# Patient Record
Sex: FEMALE | Race: WHITE
[De-identification: names, ages, dates, MRNs, and addresses within clinical notes are randomized per-mention and may not be internally consistent; named-entity substitution may affect disease eponyms.]

---

## 2019-01-22 ENCOUNTER — HOSPITAL ENCOUNTER (EMERGENCY)
Dept: HOSPITAL 25 - ED | Age: 59
Discharge: HOME | End: 2019-01-22
Payer: COMMERCIAL

## 2019-01-22 VITALS — SYSTOLIC BLOOD PRESSURE: 119 MMHG | DIASTOLIC BLOOD PRESSURE: 65 MMHG

## 2019-01-22 DIAGNOSIS — J45.909: ICD-10-CM

## 2019-01-22 DIAGNOSIS — J44.1: Primary | ICD-10-CM

## 2019-01-22 DIAGNOSIS — Z87.891: ICD-10-CM

## 2019-01-22 DIAGNOSIS — E78.00: ICD-10-CM

## 2019-01-22 PROCEDURE — 99282 EMERGENCY DEPT VISIT SF MDM: CPT

## 2019-01-22 PROCEDURE — 71046 X-RAY EXAM CHEST 2 VIEWS: CPT

## 2019-01-22 NOTE — XMS REPORT
Continuity of Care Document (CCD)

 Created on:2019



Patient:Arley Zamora

Sex:Female

:1960

External Reference #:[id number]





Demographics







 Address  [address] [place] [address]

 

 Home Phone  [phone]

 

 Mobile Phone  [phone]

 

 Work Phone  [phone]

 

 Preferred Language  en

 

 Marital Status  Not  or 

 

 Buddhist Affiliation  Unknown

 

 Race  White

 

 Ethnic Group  Not  or 









Author







 Name  Shailesh Tse NMERLE

 

 Address  [address] [place] [address]









Care Team Providers







 Name  Role  Phone

 

 Shailesh Tse N.P.  Care Team Information   Unavailable









Payers







 Type  Date  Identification Numbers  Payment Provider  Subscriber

 

     Policy Number: [id number]  [facility]  Arley Zamora









 PayID: [id number]  [address]









 [place] [address]









   Effective: 1998  Policy Number: [id number]  Special Funds  Arley Zamora









 Onset: 1998  Group Number: [id number]  [id number] [facility]









 PayID: [id number]  [place] [address]







Advance Directives







 Description

 

 No Information Available







Problems







 Date  Description  Provider  Status

 

 Onset: 2007  Peptic reflux disease  Shailesh Tse, N.P.  Active

 

 Onset: 2007  Allergic rhinitis due to pollen  Shailesh Tse, N.P.  
Active

 

 Onset: 2007  Pure hypercholesterolemia  Shailesh Tse, N.P.  Active







Family History







 Date  Family Member(s)  Problem(s)  Comments

 

 :  (age  Father   due to MI  



 75 Years)      

 

   Mother   due to Congestive  ()



     Heart Failure  

 

   Mother  Osteoporosis  

 

   Mother  Nonhogkins Lymphoma  

 

   First Sister  Osteoporosis  

 

   First Sister  Heart Disease   quadruple bypass



       2018

 

   Second Sister  Osteoporosis  

 

   Second Sister  Cancer, Uterine  

 

   Second Sister  Diabetes, Adult  

 

   Second Sister  Hypothyroidism  

 

   Third Sister  Osteoporosis  

 

   Maternal Grandfather  Cancer, Colon  

 

   Maternal Aunts  Cancer, Breast  







Social History







 Type  Date  Description  Comments

 

 Birth Sex    Unknown  

 

 Marital Status       (2015)

 

 Occupation     at prison  

 

 ETOH Use    Denies alcohol use  

 

 Tobacco Use  Start: Unknown End:  Patient is a former  



   Unknown  smoker  

 

 Smoking Status  Reviewed: 17  Patient is a former  



     smoker  

 

 Tattoo/Piercing    Tattoo  left ankle/rt calf/ rt



       arm/ rt toe

 

 Personal Habits    Previous Smoker 1/2 pack  



     daily for 11 yrs  







Allergies, Adverse Reactions, Alerts







 Date  Description  Reaction  Status  Severity  Comments

 

 2006  PCN    Active    







Medications







 Medication  Date  Status  Form  Strength  Qnty  SIG  Indications  Ordering



                 Provider

 

 Zithromax    Active  Tablets  250mg  6tabs  2 po day              one then 1    Mashelle,



             tab po qd    N.P.



             x 4 days    

 

 Work Note    Active        no work    19-1/    Mashelle,



              for    N.P.



             medical    



             reasons    

 

 Vitamin D3    Active  Chewtabs  2000Unit                      Mashelle,



                 N.P.

 

 Cyclobenzaprine    Active  Tablets  10mg  15tab  take one    ,



 HCL  /2018        s  tablet by    Mashelle,



             mouth    N.P.



             three    



             times a    



             day as    



             needed    



             muscle    



             spasm    

 

 Ofloxacin (Otic)    Active  Solution  0.3%  5ml  4 drops L              ear qd x 5    Mashelle,



             days    N.P.

 

 Hydrocortisone    Active  Suppository  25mg  24uni  insert one    



 Acetate          ts  rectally    Mashelle,



             every 6-8h    N.P.



             as needed    

 

 Flonase Allergy    Active  Suspension  50mcg/Act  9.900  2 sprays    
Dedrick,



 Relief  /2016        ml  each    Mashelle,



             nostril q    N.P.



             am    

 

 Atorvastatin    Active  Tablets  10mg  30tab  take 1    Dedrick,



 Calcium          s  tablet by    Mashelle,



             mouth once    N.P.



             daily    

 

 Famciclovir    Active  Tablets  500mg  30tab  take 1    [place],



           s  tablet by    Mashelle,



             mouth once    N.P.



             daily    

 

 Montelukast    Active  Tablets  10mg  30tab  take 1    Dedrick,



 Sodium          s  tablet by    Mashelle,



             mouth at    N.P.



             bedtime    

 

 Tramadol HCL    Active  Tablets  50mg  20tab  take 1 to            s  2 tablets    Mashelle,



             by mouth    N.P.



             every 6    



             hours as    



             needed for    



             pain,    



             maximum    



             daily    



             dose=eight    



             tablets    

 

                 

 

 Ciprofloxacin HCL    Hx  Tablets  500mg  20tab  1 by mouth    [place],



           s  twice a    Mashelle,



   -          day x 10    N.P.



   08/09          days    



   /2018              

 

 Alendronate    Hx  Tablets  70mg  4tabs  take one    [place],



 Sodium            tablet by    Mashelle,



   -          mouth once    N.P.



             weekly as    



             directed    

 

 Ciprofloxacin HCL    Hx  Tablets  500mg  20tab  1 by mouth    Dedrick,



           s  twice a    Mashelle,



   -          day x 10    N.P.



   03/27          days    



   /2018              

 

 Azithromycin    Hx  Tablets  250mg  6tabs  2 by mouth    [place],



             day one    Mashelle,



   -          then 1 by    N.P.



   03/20          mouth    



   /2018          every day    



             x 4 days    

 

 Work Note    Hx        no work    [place],



   /2017          6/15/17    Mashelle,



   -          for    N.P.



             medical    



             reasons    

 

 Cyclobenzaprine    Hx  Tablets  10mg  30tab  take one    [place],



 HCL          s  tablet by    Mashelle,



   -          mouth    N.P.



   03/27          three    



   /2018          times a    



             day as    



             needed    



             muscle    



             spasm    

 

 Ibuprofen    Hx  Tablets  800mg  40tab  1 by mouth    [place],



           s  four times    Mashelle,



   -          a day with    N.P.



   03/27          food    



   /2018              

 

 Doxycycline    Hx  Tablets  100mg  20tab  1 by mouth    Dedrick,



 Hyclate  /2016        s  twice a    Mashelle,



   -          day x 10    N.P.



   01/26          days    



   /2017              

 

 Methylprednisolon    Hx  Tablets  4mg  1pack  take as    Dedrick,



           directed    Mashelle,



   -              N.P.



                 

 

 Cheratussin ac    Hx  Syrup  100-10mg/  473ml  1 teaspoon          5ML    every 4-6    Mashelle,



   -          h as    N.P.



             cough    

 

 Prednisone    Hx  Tablets  50mg  3tabs  1 by mouth    Dedrick,



             every day    Mashelle,



   -          x 3 days    N.P.



                 

 

 Sulfamethoxazole/    Hx  Tablets  800-160mg  20tab  1 po bid x    [place],



 Trimethoprim         s  10 day    Mashelle,



   -              N.P.



                 

 

 Ciprofloxacin HCL    Hx  Tablets  500mg  20tab  1 by mouth    [place],



   /2016        s  twice a    Mashelle,



   -          day x 10    N.P.



   11/16          days    



   /2016              

 

 Meclizine HCL    Hx  Tablets  25mg  12tab  1 by mouth    ,



           s  three    Mashelle,



   -          times a    N.P.



   01/26          day    



   /2017              

 

 Escitalopram    Hx  Tablets  10mg  30tab  take 1    ,



 Oxalate  /2016        s  tablet by    Mashelle,



   -          mouth    N.P.



   11/16          every    



   /2016          night at    



             bedtime    

 

 Prolia    Hx  Solution  60mg/ml    60mg q 6              mo    Masefrenle,



   -              N.P.



                 

 

 Ibuprofen    Hx  Tablets  800mg  40tab  1 by mouth    [place],



           s  four times    Mashelle,



   -          a day with    N.P.



   07/21          food    



   /2016              

 

 Doxycycline    Hx  Capsules  100mg  20cap  1 by mouth    Dedrick,



 Hyclate  /2015        s  twice a    Mashelle,



   -          day x 10    N.P.



   02/04          day    



   /2016              

 

 Cefdinir    Hx  Capsules  300mg  20cap  1 po bid x    [place],



           s  10 days    Mashelle,



   -              N.P.



                 

 

 Desloratadine  10/20  Hx  Tablets  5mg  30tab  1 by mouth    Dedrick,



           s  every day    Mashelle,



   -              N.P.



                 

 

 Hydroxyzine HCL    Hx  Tablets  25mg  60tab  1-2 PO q    [place],



           s  hs    [facility],



   -              N.P.



   10/20              



   /2015              

 

 Wellbutrin XL    Hx  Tablets ER  150mg  30tab  1 by mouth    [place],



       24HR    s  every am    Mashelle,



   -              N.P.



   10/20              



   /2015              

 

 Cipro    Hx  Tablets  500mg  20tab  1 by mouth    [place],



           s  twice a    Mashelle,



   -          day x 10    N.P.



   07/07          days    



   /2015              

 

 Azithromycin    Hx  Tablets  250mg  6tabs  2 po day              one then 1    Mashelle,



   -          po qd x 4    N.P.



   09/30          days    



   /2014              

 

 Cheratussin ac    Hx  Syrup  100-10mg/  236ml  1 teaspoon          5ML    every 4-6    Mashelle,



   -          h prn    N.P.



             cough    



                 

 

 Cefdinir  10/10  Hx  Capsules  300mg  20cap  1 po bid x            s  10 days    Mashelle,



   -              N.P.



                 

 

 Cheratussin ac    Hx  Syrup  100-10mg/  236ml  1 teaspoon          5ML    every 4-6    Mashelle,



   -          h prn    N.P.



   10/10          cough    



                 

 

 Azithromycin    Hx  Tablets  250mg  6tabs  2 po day              one then 1    Mashelle,



   -          po qd x 4    N.P.



   04/01          days    



   /2013              

 

 Methylprednisolon    Hx  Tablets  4mg  1pack  take as    Dedrick,



 e Dose Pack  /2013          directed    [place],



   -              N.P.



   10/10              



   /2013              

 

 Doxycycline  01/15  Hx  Tablets  100mg  20tab  1 po bid x    Dedrick,



 Hyclate  /2013        s  10 days    Mashelle,



   -              N.P.



   10/10              



   /2013              

 

 Cipro    Hx  Tablets  500mg  20tab  1 po bid x            s  10 days    Mashelle,



   -              N.P.



   01/15              



   /2013              

 

 Cheratussin ac    Hx  Syrup  100-10mg/  236ml  1 teaspoon          5ML    every 4-6    Mashelle,



   -          h prn    N.P.



   01/15          cough    



   /2013              

 

 Work Note    Hx        no work    Dedrick,



   /2012          3/28/13-4/    Shailesh,



   -          3/13   for    N.P.



   07/07          medical    



   /2015          reasons    

 

 Diflucan  06/15  Hx  Tablets  150mg  1tabs  1 po qd x              1    Mashelle,



   -              N.P.



                 

 

 Medical Order    Hx        soft              cervical    Masashlie,



   -          collar    N.P.



             sig: use    



             as    



             directed    



             dx:cervica    



             lgia    

 

 Xyzal    Hx  Tablets  5mg  30tab  1 po qd            s      Shailesh,



   -              N.P.



                 

 

 Work Note  10/24  Hx        may return    Dedrick          to work    Shailesh,



   -          10/25/11    N.P.



                 

 

 Crestor    Hx  Tablets  10mg  15tab  Take         s  Tablet By    Shailesh,



   -          Mouth Once    N.P.



   01/09          Daily    



   /2014              

 

 Fexofenadine HCL    Hx  Tablets  60mg  60tab  Take One            s  Tablet By    Shailesh,



   -          Mouth    N.P.



             Twice A    



             Day    

 

 Ibuprofen    Hx  Tablets  600mg  40tab  1 po qid    Dedrick        s  with food    Shailesh,



   -              N.P.



                 

 

 Cyclobenzaprine    Hx  Tablets  10mg  90tab  take one    ,



 HCL  /2011        s  tablet by    Shailesh,



   -          mouth    N.P.



   07/21          three    



   /2016          times a    



             day    

 

 Zithromax Z-Darius    Hx  Tablets  250mg  1tabs  as              directed    Shailesh,



   -              N.P.



                 

 

 Work Note    Hx        no work    Dedrick11-    Shailesh



     for    N.P.



   06/09          medical    



   /2011          reasons    

 

 Diflucan    Hx  Tablets  100mg  21tab  2 tabs day            s  one and    Shailesh,



   -          then qd    N.P.



                 

 

 Estradiol    Hx  Patches  0.0375mg/    apply one    Dedrick,



   /2011    Weekly  24HR    patch q    Shailesh,



   -          week    N.P.



                 

 

 Estradiol    Hx  Patches  0.025mg/2  4unit  apply     Weekly  4HR  s  patch    Shailesh,



   -          every week    N.P.



   06/14          as    



   /2012          directed    

 

 Anusol-HC  10/06  Hx  Suppository  25mg  25uni  insert one    Dedrick,



   /2010        ts  rectally    Shailesh,



   -          bid-tid    N.P.



                 

 

 Flexeril  10/06  Hx  Tablets  10mg  90tab  1 po tid            s      Shailesh,



   -              N.P.



                 

 

 Soma    Hx  Tablets  350mg  40tab  1 po qid    Dedrick        s      Shailesh



   -              N.P.



   10/06              



   /2010              

 

 Estradiol    Hx  Patches  0.075mg/2        [place]    Weekly  4HR        Shailesh,



   -              N.P.



                 

 

 Citalopram    Hx  Tablets  20mg  30tab  take one    Dedrick,



 Hydrobromide          s  tablet by    Shailesh,



   -          mouth    N.P.



             every day    



                 

 

 Diflucan    Hx  Tablets  150mg  1tabs  1 po qd x    Dedrick          1    Shailesh,



   -              N.P.



                 

 

 Lexapro    Hx  Tablets  10mg  30tab  1 po qd    Dedrick,



           s      Shailesh,



   -              N.P.



                 

 

 Zithromax Z-Darius    Hx  Tablets  250mg  1tabs  as              directed    Shailesh,



   -              N.P.



                 

 

 Crestor  02/15  Hx  Tablets  5mg  30tab  1 po qd    [place]        s      Shailesh,



   -              N.P.



                 

 

 Cipro    Hx  Tablets  500mg  20tab  1 po bid x    [place],



           s  10 days    Shailesh,



   -              N.P.



                 

 

 Allegra    Hx  Tablets  60mg  60tab  1 po bid    Dedrick        s      Shailesh



   -              N.P.



                 

 

 Medrol Dosepak    Hx  Tablets  4mg  1Pack  as    Eppto          directeted    Chris Adam MD



                 

 

 Nasonex    Hx  Suspension  50mcg/Act  1unit  2  sp x 2    [place],



           s  qd    Chris Adam MD



                 

 

 Cipro    Hx  Tablets  500mg  28tab  1 po bid x    Dedrick,



           s  14 days    Shailesh,



   -              N.P.



                 

 

 Singulair    Hx  Tablets  10mg  30tab  take one            s  tablet by    Shailesh,



   -          mouth    N.P.



   09/20          every    



   /2012          evening    

 

 Cipro    Hx  Tablets  500mg  20tab  1 po bid x    [place],



           s  10 days    Shailesh,



   -              N.P.



                 

 

 Simvastatin    Hx  Tablets  40mg  30tab  1 po qd    Dedrick,



           s      Shailesh,



   -              N.P.



                 

 

 Famvir    Hx  Tablets  500mg  30tab  take one            s  tablet by    Shailesh,



   -          mouth    N.P.



             every day    



                 

 

 Pantoprazole    Hx  Tablets DR  40mg  30tab  take 1    ,



 Sodium  /2009        s  tablet by    Shailesh,



   -          mouth once    N.P.



             daily    



                 

 

 Physical Therapy    Hx        eval and              treat l    Shailesh,



   -          shoulder    N.P.



             and neck    



             pain dx:    



             pain    

 

 Cipro    Hx  Tablets  500mg  20tab  1 po bid x    [place]        s  10 days    Shailesh,



   -              N.P.



                 

 

 Lidex    Hx  Cream  0.05%  30gm  apply bid    Dedrick,



   /2007          x 10 days    Shailesh,



   -              N.P.



                 

 

 Vytorin    Hx  Tablets  10mg;20  30tab  1 PO qd    Trabroman      mg  s      Chris Sam MD



                 

 

 Nexium    Hx  Capsules  20mg  90cap  1 po qd    [place],



           s      Shailesh,



   -              N.P.



                 

 

 Allegra D    Hx  Tablets  60mg;120  60tab  1 po q 12h    [place],



         mg  s      Shailesh,



   -              N.P.



                 

 

 Crestor    Hx  Tablets  10mg  30tab  1 po qd    Dedrick,



           s      Shailesh,



   -              N.P.



                 

 

 Fosamax    Hx  Tablets  70mg  12tab  1 po q    [place],



           s  week on    Mashelle,



   -          empty    N.P.



             stomach as    



   /2010          dir    

 

 Valtrex    Hx  Caplets  500mg  14cap  1 po  q D    [place]        s      Masashlie,



   -              N.P.



                 

 

 Valtrex    Hx  Caplets  1Gram  90cap  1 po qd    [place],



           s      Shailesh,



   -              N.P.



                 

 

 Celebrex    Hx  Capsules  200mg  30cap  1 po qd    Dedrick,



           s      Shailesh,



   -              N.P.



                 

 

 Darvocet N 100    Hx  Tablets  100mg;650  40tab  1 po q 4-6    [place]      mg  s  hours prn    Shailesh,



   -              N.P.



                 

 

 Flexeril    Hx  Tablets  10mg  90tab  1 po tid    Dedrick,



           s      Shailesh,



   -              N.P.



                 







Immunizations







 CPT Code  Status  Date  Vaccine  Lot #

 

 [id number]  Given  10/18/2018  Influenza Vac, Quadrivalent, Split, 0.5mL Dosage,  



       Im Use  

 

 12440  Given  2016  Influenza Vac, Quadrivalent, Split, 0.5mL Dosage,  
[id number]



       Im Use  

 

 00579  Given  2016  Tdap (Adacel) Ages 7 And Above Only  [id number]

 

   Given  10/10/2013  Fluzone Trivalent Immunization  

 

   Given  10/10/2013  Fluzone Trivalent Immunization  

 

   Given  10/10/2013  Fluzone Trivalent Immunization  [id number]







Vital Signs







 Date  Vital  Result  Comment

 

 2019 11:50am  Body Temperature  99.5 F  









 Weight  138.12 lb  

 

 Heart Rate  81 /min  

 

 BP Systolic  136 mmHg  

 

 BP Diastolic  86 mmHg  

 

 O2 % BldC Oximetry  97 %  









 2018  7:51am  Body Temperature  97.3 F  









 Weight  139.00 lb  

 

 Heart Rate  62 /min  

 

 BP Systolic  114 mmHg  

 

 BP Diastolic  76 mmHg  

 

 O2 % BldC Oximetry  98 %  









 2018  7:45am  Body Temperature  96.8 F  









 Weight  139.12 lb  

 

 Heart Rate  59 /min  

 

 BP Systolic  112 mmHg  

 

 BP Diastolic  71 mmHg  

 

 O2 % BldC Oximetry  98 %  









 2018  8:21am  Body Temperature  97.2 F  









 Weight  139.50 lb  

 

 Heart Rate  67 /min  

 

 BP Systolic  124 mmHg  

 

 BP Diastolic  73 mmHg  

 

 O2 % BldC Oximetry  98 %  









 2018 10:28am  Body Temperature  98.1 F  









 Weight  140.50 lb  

 

 Heart Rate  65 /min  

 

 BP Systolic  117 mmHg  

 

 BP Diastolic  67 mmHg  

 

 Height  64.4 inches  5'4.40"

 

 O2 % BldC Oximetry  99 %  

 

 BMI (Body Mass Index)  23.8 kg/m2  









 2018  8:20am  Body Temperature  98.2 F  









 Weight  142.00 lb  

 

 Heart Rate  72 /min  

 

 BP Systolic  112 mmHg  

 

 BP Diastolic  68 mmHg  

 

 O2 % BldC Oximetry  98 %  









 2018  1:16pm  Body Temperature  98.4 F  









 Weight  142.00 lb  

 

 Heart Rate  79 /min  

 

 BP Systolic  112 mmHg  

 

 BP Diastolic  68 mmHg  

 

 O2 % BldC Oximetry  98 %  









 2017  1:11pm  Body Temperature  98.2 F  









 Weight  138.38 lb  

 

 Heart Rate  88 /min  

 

 BP Systolic  128 mmHg  

 

 BP Diastolic  75 mmHg  

 

 O2 % BldC Oximetry  98 %  









 2017  3:19pm  Body Temperature  97.3 F  









 Weight  140.50 lb  

 

 Heart Rate  71 /min  

 

 BP Systolic  106 mmHg  

 

 BP Diastolic  63 mmHg  

 

 O2 % BldC Oximetry  96 %  









 2017  9:12am  Body Temperature  97.3 F  









 Weight  140.38 lb  

 

 Heart Rate  59 /min  

 

 BP Systolic  121 mmHg  

 

 BP Diastolic  69 mmHg  

 

 Height  54 inches  4'6"

 

 BMI (Body Mass Index)  33.8 kg/m2  









 2016  2:11pm  Body Temperature  97.2 F  









 Weight  144.25 lb  

 

 Heart Rate  80 /min  

 

 BP Systolic  103 mmHg  

 

 BP Diastolic  64 mmHg  

 

 O2 % BldC Oximetry  98 %  









 2016  1:00pm  Body Temperature  97.5 F  









 Weight  146.25 lb  

 

 Heart Rate  64 /min  

 

 BP Systolic  122 mmHg  

 

 BP Diastolic  70 mmHg  

 

 O2 % BldC Oximetry  99 %  









 2016 10:55am  Body Temperature  97.3 F  









 Weight  150.50 lb  

 

 Heart Rate  80 /min  

 

 BP Systolic  129 mmHg  

 

 BP Diastolic  76 mmHg  

 

 O2 % BldC Oximetry  98 %  









 2016 12:46pm  Body Temperature  97.0 F  









 Weight  151.50 lb  

 

 Heart Rate  83 /min  

 

 BP Systolic  137 mmHg  

 

 BP Diastolic  74 mmHg  

 

 O2 % BldC Oximetry  98 %  









 2016  9:29am  Body Temperature  98.2 F  









 Weight  149.38 lb  

 

 Heart Rate  71 /min  

 

 BP Systolic  121 mmHg  

 

 BP Diastolic  75 mmHg  

 

 O2 % BldC Oximetry  99 %  









 2016  1:32pm  Body Temperature  96.6 F  









 Weight  152.12 lb  

 

 Heart Rate  86 /min  

 

 BP Systolic  113 mmHg  

 

 BP Diastolic  70 mmHg  

 

 O2 % BldC Oximetry  98 %  









 2016  1:13pm  Body Temperature  97.5 F  









 Weight  152.38 lb  

 

 Heart Rate  91 /min  

 

 BP Systolic  120 mmHg  

 

 BP Diastolic  69 mmHg  

 

 O2 % BldC Oximetry  98 %  









 2015  3:41pm  Body Temperature  97.9 F  









 Weight  152.25 lb  

 

 Heart Rate  71 /min  

 

 BP Systolic  119 mmHg  

 

 BP Diastolic  70 mmHg  









 10/20/2015  1:57pm  Body Temperature  97.9 F  









 Weight  152.00 lb  

 

 Heart Rate  79 /min  

 

 BP Systolic  114 mmHg  

 

 BP Diastolic  69 mmHg  

 

 O2 % BldC Oximetry  99 %  









 2015  1:03pm  Body Temperature  97.5 F  









 Weight  153.38 lb  

 

 Heart Rate  72 /min  

 

 BP Systolic  128 mmHg  

 

 BP Diastolic  78 mmHg  

 

 O2 % BldC Oximetry  97 %  









 2015  1:41pm  Body Temperature  97.0 F  









 Weight  152.38 lb  

 

 Heart Rate  80 /min  

 

 BP Systolic  118 mmHg  

 

 BP Diastolic  69 mmHg  

 

 O2 % BldC Oximetry  98 %  









 2014  3:26pm  Weight  156.12 lb  









 Heart Rate  72 /min  

 

 BP Systolic  120 mmHg  

 

 BP Diastolic  68 mmHg  

 

 O2 % BldC Oximetry  99 %  









 2014 10:54am  Body Temperature  98.2 F  









 Weight  150.00 lb  

 

 Heart Rate  72 /min  

 

 BP Systolic  111 mmHg  

 

 BP Diastolic  68 mmHg  

 

 O2 % BldC Oximetry  98 %  









 10/10/2013  8:15am  Body Temperature  96.5 F  









 Weight  147.00 lb  

 

 Heart Rate  80 /min  

 

 BP Systolic  110 mmHg  

 

 BP Diastolic  68 mmHg  

 

 O2 % BldC Oximetry  98 %  









 2013 10:44am  Body Temperature  96.2 F  









 Weight  139.12 lb  

 

 Heart Rate  78 /min  

 

 BP Systolic  110 mmHg  

 

 BP Diastolic  80 mmHg  

 

 O2 % BldC Oximetry  98 %  









 01/15/2013  4:26pm  Body Temperature  99.1 F  









 Weight  145.00 lb  

 

 Heart Rate  97 /min  

 

 BP Systolic  120 mmHg  

 

 BP Diastolic  80 mmHg  

 

 O2 % BldC Oximetry  98 %  









 2012 11:39am  Body Temperature  97.9 F  









 Weight  146.00 lb  

 

 Heart Rate  88 /min  

 

 BP Systolic  128 mmHg  

 

 BP Diastolic  74 mmHg  

 

 O2 % BldC Oximetry  98 %  









 2012  2:10pm  Body Temperature  97.8 F  









 Weight  147.00 lb  

 

 Heart Rate  87 /min  

 

 BP Systolic  110 mmHg  

 

 BP Diastolic  80 mmHg  

 

 O2 % BldC Oximetry  98 %  









 2012 12:52pm  Body Temperature  98.2 F  









 Weight  146.00 lb  

 

 Heart Rate  70 /min  

 

 BP Systolic  101 mmHg  

 

 BP Diastolic  61 mmHg  

 

 Height  63 inches  5'3"

 

 O2 % BldC Oximetry  96 %  

 

 BMI (Body Mass Index)  25.9 kg/m2  









 2012 11:13am  Body Temperature  98.1 F  









 Weight  149.12 lb  

 

 Heart Rate  75 /min  

 

 BP Systolic  115 mmHg  

 

 BP Diastolic  70 mmHg  

 

 O2 % BldC Oximetry  99 %  









 10/24/2011  1:21pm  Body Temperature  97.9 F  









 Weight  147.00 lb  

 

 Heart Rate  75 /min  

 

 BP Systolic  110 mmHg  

 

 BP Diastolic  70 mmHg  

 

 O2 % BldC Oximetry  93 %  









 2011  2:55pm  Weight  151.00 lb  









 Heart Rate  83 /min  

 

 BP Systolic  120 mmHg  

 

 BP Diastolic  70 mmHg  

 

 Height  64 inches  5'4"

 

 O2 % BldC Oximetry  98 %  

 

 BMI (Body Mass Index)  25.9 kg/m2  









 2011  1:50pm  Body Temperature  98.9 F  









 Weight  148.00 lb  

 

 Heart Rate  83 /min  

 

 BP Systolic  100 mmHg  

 

 BP Diastolic  70 mmHg  

 

 O2 % BldC Oximetry  98 %  









 03/10/2011  3:41pm  Weight  147.00 lb  









 Heart Rate  80 /min  

 

 BP Systolic  102 mmHg  

 

 BP Diastolic  60 mmHg  

 

 Height  63 inches  5'3"

 

 O2 % BldC Oximetry  99 %  

 

 BMI (Body Mass Index)  26.0 kg/m2  









 2011  1:19pm  Weight  147.00 lb  









 Heart Rate  78 /min  

 

 BP Systolic  100 mmHg  

 

 BP Diastolic  62 mmHg  

 

 O2 % BldC Oximetry  98 %  









 02/15/2011  8:18am  Body Temperature  98.3 F  









 Weight  147.00 lb  

 

 Heart Rate  62 /min  

 

 BP Systolic  100 mmHg  

 

 BP Diastolic  60 mmHg  

 

 O2 % BldC Oximetry  98 %  









 10/20/2010  3:19pm  Body Temperature  98.4 F  









 Weight  146.00 lb  

 

 Heart Rate  88 /min  

 

 BP Systolic  102 mmHg  

 

 BP Diastolic  68 mmHg  

 

 O2 % BldC Oximetry  98 %  









 10/06/2010  3:27pm  Body Temperature  98.4 F  









 Weight  147.00 lb  

 

 Heart Rate  83 /min  

 

 BP Systolic  108 mmHg  

 

 BP Diastolic  70 mmHg  

 

 Respiratory Rate  20 /min  

 

 O2 % BldC Oximetry  96 %  









 2010  1:05pm  Body Temperature  98.5 F  









 Weight  142.00 lb  

 

 Heart Rate  64 /min  

 

 BP Systolic  100 mmHg  

 

 BP Diastolic  60 mmHg  









 2010  3:54pm  Body Temperature  98.4 F  









 Weight  141.00 lb  

 

 Heart Rate  69 /min  

 

 BP Systolic  110 mmHg  

 

 BP Diastolic  64 mmHg  

 

 Height  64 inches  5'4"

 

 O2 % BldC Oximetry  98 %  

 

 BMI (Body Mass Index)  24.2 kg/m2  









 2010  3:25pm  Body Temperature  99.2 F  









 Weight  141.00 lb  

 

 Heart Rate  82 /min  

 

 BP Systolic  120 mmHg  

 

 BP Diastolic  62 mmHg  

 

 Height  64 inches  5'4"

 

 O2 % BldC Oximetry  98 %  

 

 BMI (Body Mass Index)  24.2 kg/m2  









 2010  8:33am  Body Temperature  98.1 F  









 Weight  143.00 lb  

 

 Heart Rate  72 /min  

 

 BP Systolic  108 mmHg  

 

 BP Diastolic  60 mmHg  

 

 Height  64 inches  5'4"

 

 O2 % BldC Oximetry  99 %  

 

 BMI (Body Mass Index)  24.5 kg/m2  









 2010  3:47pm  Body Temperature  98.5 F  









 Weight  147.00 lb  

 

 Heart Rate  70 /min  

 

 BP Systolic  132 mmHg  

 

 BP Diastolic  72 mmHg  









 2009  8:21am  Body Temperature  98.5 F  









 Weight  162.00 lb  

 

 Heart Rate  70 /min  

 

 BP Systolic  124 mmHg  

 

 BP Diastolic  70 mmHg  









 2009  3:35pm  Body Temperature  98.7 F  









 Weight  160.00 lb  

 

 Heart Rate  68 /min  

 

 BP Systolic  132 mmHg  

 

 BP Diastolic  68 mmHg  

 

 O2 % BldC Oximetry  98 %  









 2009  9:51am  Body Temperature  97.8 F  









 Weight  161.00 lb  

 

 Heart Rate  90 /min  

 

 BP Systolic  124 mmHg  

 

 BP Diastolic  84 mmHg  

 

 Height  64 inches  5'4"

 

 BMI (Body Mass Index)  27.6 kg/m2  









 2009 12:48pm  Body Temperature  98.0 F  









 Weight  163.00 lb  

 

 Heart Rate  80 /min  

 

 BP Systolic  118 mmHg  

 

 BP Diastolic  78 mmHg  

 

 Height  64 inches  5'4"

 

 O2 % BldC Oximetry  96 %  

 

 BMI (Body Mass Index)  28.0 kg/m2  









 2008  2:22pm  Weight  162.00 lb  









 Heart Rate  80 /min  

 

 BP Systolic  107 mmHg  

 

 BP Diastolic  73 mmHg  

 

 Height  64 inches  5'4"

 

 BMI (Body Mass Index)  27.8 kg/m2  









 2008  8:38am  Body Temperature  98.5 F  









 Weight  162.00 lb  

 

 Heart Rate  80 /min  

 

 BP Systolic  130 mmHg  

 

 BP Diastolic  82 mmHg  

 

 Height  64 inches  5'4"

 

 BMI (Body Mass Index)  27.8 kg/m2  









 2008  3:04pm  Weight  161.00 lb  









 Heart Rate  102 /min  

 

 BP Systolic  150 mmHg  

 

 BP Diastolic  76 mmHg  

 

 Height  64 inches  5'4"

 

 BMI (Body Mass Index)  27.6 kg/m2  









 10/04/2007  1:41pm  Body Temperature  98.1 F  









 Weight  158.00 lb  

 

 Heart Rate  76 /min  

 

 BP Systolic  104 mmHg  

 

 BP Diastolic  72 mmHg  

 

 Height  64 inches  5'4"

 

 BMI (Body Mass Index)  27.1 kg/m2  









 2007  3:33pm  Body Temperature  98.0 F  









 Weight  155.00 lb  

 

 Heart Rate  64 /min  

 

 BP Systolic  104 mmHg  

 

 BP Diastolic  66 mmHg  

 

 Height  64 inches  5'4"

 

 BMI (Body Mass Index)  26.6 kg/m2  









 2007  4:12pm  Body Temperature  98.8 F  









 Weight  157.00 lb  

 

 Heart Rate  80 /min  

 

 BP Systolic  110 mmHg  

 

 BP Diastolic  66 mmHg  

 

 Height  64 inches  5'4"

 

 BMI (Body Mass Index)  26.9 kg/m2  









 2007  3:39pm  Body Temperature  98.1 F  









 Weight  157.00 lb  

 

 Heart Rate  76 /min  

 

 BP Systolic  110 mmHg  

 

 BP Diastolic  74 mmHg  

 

 Height  64 inches  5'4"

 

 BMI (Body Mass Index)  26.9 kg/m2  









 10/19/2006  3:25pm  Body Temperature  97.7 F  









 Weight  157.00 lb  

 

 Heart Rate  76 /min  

 

 BP Systolic  100 mmHg  

 

 BP Diastolic  74 mmHg  

 

 Height  64 inches  5'4"

 

 BMI (Body Mass Index)  26.9 kg/m2  









 2006  4:36pm  Body Temperature  99.0 F  









 Weight  156.00 lb  

 

 Heart Rate  64 /min  

 

 BP Systolic  126 mmHg  

 

 BP Diastolic  76 mmHg  

 

 Height  64 inches  5'4"

 

 BMI (Body Mass Index)  26.8 kg/m2  









 2006 10:23am  Body Temperature  98.3 F  









 Weight  156.00 lb  

 

 Heart Rate  84 /min  

 

 BP Systolic  110 mmHg  

 

 BP Diastolic  76 mmHg  

 

 Height  64 inches  5'4"

 

 BMI (Body Mass Index)  26.8 kg/m2  







Results







 Test  Date  Facility  Test  Result  H/L  Range  Note

 

 Comprehensive Met Panel-[facility]  2018  [facility]  Sodium  142 mmol/L    135-
146  1









 Potassium  3.8 mmol/L    3.5-5.2  

 

 Chloride#  104 mmol/L      2

 

 Carbon Dioxide  28 mmol/L    24-34  

 

 Glucose  83 mg/dL      

 

 BUN  16 mg/dL    6-26  

 

 Creatinine  0.8 mg/dL    0.5-1.4  

 

 Calcium  9.0 mg/dL    8.5-10.2  

 

 Total Protein  6.7 g/dL    6.0-8.0  

 

 Albumin  4.8 g/dL    3.6-4.9  

 

 Globulin  1.9 g/dL  Low  2.0-3.5  

 

 A/G Ratio  2.5 Ratio  High  1.0-2.2  

 

 Total Bilirubin  0.7 mg/dL    0.1-1.3  

 

 Alkaline Phosphatase  51 U/L      

 

 Alt  12 U/L    3-42  

 

 Ast  14 U/L    8-42  

 

  Viola Egfr  >60    >60  3

 

 Non  Viola Egfr  >60    >60  4

 

 Anion Gap  10 mmol/L    5-15  5









 Lipid  2018  [facility]  Cholesterol  182 mg/dL      









 Triglycerides  78 mg/dL      

 

 HDL  60 mg/dL    35-85  6

 

 Chol/ HDL Ratio  3.0 ratio  Low  3.7-5.6  

 

 VLDL  16 mg/dL    2-29  

 

 LDL (Calc)  106 mg/dL  High  20-99  7









 Affirm  2018  [facility]  Trichomonas Vaginalis  Negative    Negative  









 Gardnerella Vaginalis  Negative    Negative  

 

 Candida Species  Negative    Negative  









 Laboratory test  2018  [facility]  [facility] Pap  SEE NOTE      8



 finding              

 

 Affirm  2017  [facility]  Trichomonas Vaginalis  Negative    Negative  9









 Gardnerella Vaginalis  Negative    Negative  

 

 Candida Species  Negative    Negative  









 Lipid  2017  [facility]  Cholesterol  180 mg/dL      









 Triglycerides  118 mg/dL      

 

 HDL  57 mg/dL    35-85  10

 

 Chol/ HDL Ratio  3.2 ratio  Low  3.7-5.6  

 

 VLDL  24 mg/dL    2-29  

 

 LDL (Calc)  99 mg/dL    20-99  11









 Comprehensive Metabolic (CMP)  2017  [facility]  Sodium  141 mmol/L    134-
142  









 Potassium  4.2 mmol/L    3.5-5.2  

 

 Chloride  102 mmol/L      

 

 Carbon Dioxide  31 mmol/L    24-34  

 

 Glucose  91 mg/dL      

 

 BUN  17 mg/dL    6-26  

 

 Creatinine  0.9 mg/dL    0.5-1.4  

 

 Calcium  9.7 mg/dL    8.5-10.2  

 

 Total Protein  7.3 g/dL    6.0-8.0  

 

 Albumin  4.8 g/dL    3.6-4.9  

 

 Globulin  2.5 g/dL    2.0-3.5  

 

 A/G Ratio  1.9 Ratio    1.0-2.2  

 

 Total Bilirubin  0.7 mg/dL    0.1-1.3  

 

 Alkaline Phosphatase  56 U/L      

 

 Alt  10 U/L    3-42  

 

 Ast  12 U/L    8-42  

 

 Anion Gap  12 mmol/L    6-14  

 

 African Viola Egfr  >60    >60  12

 

 Non  Viola Egfr  >60    >60  13









 Laboratory test finding  2017  [facility]  Calcium  9.7 mg/dL    8.5-10.2  









 TSH  2.11 uIU/mL    0.35-4.94  









 CBC With Auto Diff  2017  [facility]  WBC  5.3 K/uL    4.1-11.0  









 RBC  4.80 M/uL    4.00-5.40  

 

 Hemoglobin  14.1 gm/dL    12.0-16.0  

 

 Hematocrit  42.1 %    36.0-47.0  

 

 MCV  87.7 fL    80.0-97.0  

 

 MCH  29.3 pg    27.0-32.0  

 

 MCHC  33.4 g/dL    32.0-36.0  

 

 RDW  13.2 %    11.5-14.5  

 

 PLT Count  286 K/ul    140-400  

 

 Neutrophil  52.5 %    35.0-75.0  

 

 Lymphocyte  35.4 %    16.0-52.0  

 

 Monocyte  9.1 %    2.0-10.0  

 

 Eosinophil  1.2 %    0.0-5.0  

 

 Basophil  1.8 %    0.0-4.0  

 

 Abs Neutrophils  2.8 K/uL    2.1-8.0  

 

 Abs Lymphocytes  1.9 K/uL    0.8-5.5  

 

 Abs Monocytes  0.5 K/uL    0.1-1.0  

 

 Abs Eosinophils  0.1 K/uL    0.0-0.5  

 

 Abs Basophils  0.1 K/uL    0.0-0.3  









 Laboratory test  2017  [facility]  [facility] Pap  SEE NOTE      14



 finding              

 

 Laboratory test  2015  [facility]  Rheumatoid Factor  <10.0 IU/mL    0.0-
10.0  15



 finding              









 CRP (C-Reactive)  0.18 mg/dL    0.00-0.75  









 Laboratory test  2015  [facility]  Lyme Igm/Igg  NEGATIVE    (Neg)  16



 finding      AB        

 

 Laboratory test  2015  [facility]  CCP Antibody  3      17



 finding      Igg        

 

 Laboratory test  2015  [facility]  Afp-Tumor  2.3 ng/mL    (<6.0)  18, 19



 finding      Marker        

 

 Laboratory test  2015  [facility]  Hepatitis C  Non reactive    Non 
reactive  



 finding      [facility] Antibody        

 

 Comprehensive  2015  [facility]  Sodium  140 mmol/L    134-142  



 Metabolic (CMP)              









 Potassium  3.8 mmol/L    3.5-5.2  

 

 Chloride  104 mmol/L      

 

 Carbon Dioxide  28 mmol/L    24-34  

 

 Glucose  89 mg/dL      

 

 BUN  15 mg/dL    6-26  

 

 Creatinine  1.0 mg/dL    0.5-1.4  

 

 Calcium  9.3 mg/dL    8.5-10.2  

 

 Total Protein  6.9 g/dL    6.0-8.0  

 

 Albumin  4.5 g/dL    3.6-4.9  

 

 Globulin  2.4 g/dL    2.0-3.5  

 

 A/G Ratio  1.9 Ratio    1.0-2.2  

 

 Total Bilirubin  0.4 mg/dL    0.1-1.3  

 

 Alkaline Phosphatase  71 U/L      

 

 Alt  12 U/L    3-42  

 

 Ast  13 U/L    8-42  

 

 Anion Gap  12 mmol/L    6-14  

 

 African Viola Egfr  >60    >60  20

 

 Non  Viola Egfr  57  Low  >60  21









 Lipid  2015  [facility]  Cholesterol  178 mg/dL      









 Triglycerides  148 mg/dL      

 

 HDL  49 mg/dL    35-85  22

 

 Chol/ HDL Ratio  3.6 ratio  Low  3.7-5.6  

 

 VLDL  30 mg/dL  High  2-29  

 

 LDL (Calc)  99 mg/dL    20-99  23









 CBC With Auto Diff  2015  [facility]  WBC  5.4 K/uL    4.1-11.0  









 RBC  4.49 M/uL    4.00-5.40  

 

 Hemoglobin  12.8 gm/dL    12.0-16.0  

 

 Hematocrit  38.8 %    36.0-47.0  

 

 MCV  86.3 fL    80.0-97.0  

 

 MCH  28.5 pg    27.0-32.0  

 

 MCHC  33.1 g/dL    32.0-36.0  

 

 RDW  13.6 %    11.5-14.5  

 

 PLT Count  306 K/ul    140-400  

 

 Neutrophil  61.1 %    35.0-75.0  

 

 Lymphocyte  27.5 %    16.0-52.0  

 

 Monocyte  7.8 %    2.0-10.0  

 

 Eosinophil  2.6 %    0.0-5.0  

 

 Basophil  1.0 %    0.0-4.0  

 

 Abs Neutrophils  3.3 K/uL    2.1-8.0  

 

 Abs Lymphocytes  1.5 K/uL    0.8-5.5  

 

 Abmon  0.4 K/uL    0.1-1.0  

 

 Abs Eosinophils  0.1 K/uL    0.0-0.5  

 

 Abs Basophils  0.1 K/uL    0.0-0.3  









 Laboratory test  2012  [facility]  [facility] Pap  SEE NOTE      24



 finding              

 

 Affirm  2012  [facility]  Trichomonas Vaginalis  Negative    Negative  









 Gardnerella Vaginalis  Negative    Negative  

 

 Candida Species  Positive  Abnormal  Negative  









 Laboratory test  2012  [facility]  GC by Dna Probe  NEGATIVE    Negative  



 finding              

 

 Order  10/24/2011  [facility]  EKG  <pending>      



     (315)-   -          

 

 Laboratory test  2011  [facility]  GC by Dna Probe  NEGATIVE    Negative  



 finding              

 

 Affirm  2011  [facility]  Trichomonas  Negative    Negative  



       Vaginalis        









 Gardnerella Vaginalis  Negative    Negative  

 

 Candida Species  Negative    Negative  









 Laboratory test finding  2011  [facility]  [facility] Pap  SEE NOTE      25









 HPV  Positive  Abnormal  Negative  26









 Laboratory test  2011  [facility]      27



 finding    [phone]    <SEE NOTE>      

 

 CBC With Auto Diff  2010  [facility] (Do not Use)  WBC  5.0 K/ul    4.0-
10.  28



     [facility]        [address] [place] [phone]          









 RBC  4.43 M/ul    4.20-5.40  

 

 Hemoglobin  13.5 GM/dl    12.5-16.0  

 

 Hematocrit  39.9 %    36.0-47.0  

 

 MCV  90.1 FL    80.0-97.0  

 

 MCH  30.5 pg    27.0-31.0  

 

 MCHC  33.8 g/dL    32.0-36.0  

 

 RDW  12.9 %    11.5-14.5  

 

 Platelet Count  290 K/ul    140-440  

 

 Neutrophils  53.8 %    50-70  

 

 Lymphocytes  34.4 %    20-44  

 

 Monocytes  7.8 %    2-9  

 

 Eosinophil  2.5 %    0-4  

 

 Basophil  1.5 %    0-2  

 

 Absolute Neutrophils  2.7 K/ul    2.05-7.63  

 

 Absolute Lymphocytes  1.7 K/ul    0.8-4.8  

 

 Absolute Monocytes  0.4 K/ul    0.1-1.0  

 

 Absolute Eosinophils  0.1 K/ul    0.1-0.5  

 

 Absolute Basophils  0.1 K/ul    0.0-0.3  

 

 Hematology Comment (Comm2)  N/A      









 CMP  2010  [facility] (Do not Use)  Sodium  142 mmol/L    135-144  



     [facility]          



     [place] [address] [phone]          









 Potassium  4.3 mmol/L    3.6-5.2  

 

 Chloride  105 mmol/L      

 

 Carbon Dioxide  31 mmol/L    23-32  

 

 Glucose  88 mg/dL      

 

 BUN  13 mg/dL    6-22  

 

 Creatinine  0.7 mg/dL    0.5-1.3  

 

 BUN/CR  19 Ratio      

 

 Calcium  9.8 mg/dL    8.6-10.2  

 

 Total Protein  6.7 g/dL    5.8-7.8  

 

 Albumin  4.1 g/dL    3.5-4.8  

 

 Globulin  2.6 g/dL    2.0-3.5  

 

 A/G Ratio  1.6 Ratio    1.0-2.2  

 

 Total Bilirubin  0.7 mg/dL    0.3-1.2  

 

 Alkaline Phosphatase  72 U/L      

 

 Alt  21 U/L    5-45  

 

 Ast  23 U/L    12-40  

 

 Anion Gap  10 mmol/L    8-16  

 

 GFR Calculation  > 60 mL/min      29

 

 GFR For African American  > 60 mL/min      30









 Laboratory test  2010  [facility] (Do not Use)  Vitamin D, 25  64 ng/mL
      



 finding    [facility] [place]  [place] [address] [phone]          

 

 Lipid Panel  2010  [facility] (Do not Use)  Cholesterol  188 mg/dL    50
-199  



     [facility]          



     [place] [address] [phone]          









 Triglycerides  90 mg/dL      

 

 HDL  51 mg/dL    35-85  31

 

 Chol/HDL Ratio  3.7 Ratio    3.7-5.6  32

 

 VLDL  18 mg/dL    2-29  

 

 LDL (Calc)  119 mg/dL      33









 Lipid TX Panel  03/15/2010  [facility] (Do not Use)  Ast  22 U/L    12-40  



     [facility] [place] [address] [phone]          









 Alt  20 U/L    5-45  

 

 Cholesterol  176 mg/dL      34

 

 Triglycerides  97 mg/dL      

 

 HDL  51 mg/dL    35-85  35

 

 LDL (Calc)  106 mg/dL      36

 

 Chol/HDL Ratio  3.5 Ratio  Low  3.7-5.6  37

 

 VLDL  19 mg/dL    2-29  









 CBC With Auto Diff  2010  [facility] (Do not Use)  WBC  5.3 K/ul    4.0-
10.9  



     [facility] [place] [address] [phone]          









 RBC  4.72 M/ul    4.20-5.40  

 

 Hemoglobin  14.4 GM/dl    12.5-16.0  

 

 Hematocrit  42.2 %    36.0-47.0  

 

 MCV  89.4 FL    80.0-97.0  

 

 MCH  30.5 pg    27.0-31.0  

 

 MCHC  34.2 g/dL    32.0-36.0  

 

 RDW  12.9 %    11.5-14.5  

 

 Platelet Count  338 K/ul    140-440  

 

 Neutrophils  55.0 %    50-70  

 

 Lymphocytes  35.8 %    20-44  

 

 Monocytes  7.0 %    2-9  

 

 Eosinophil  1.5 %    0-4  

 

 Basophil  0.7 %    0-2  

 

 Absolute Neutrophils  2.9 K/ul    2.05-7.63  

 

 Absolute Lymphocytes  1.9 K/ul    0.8-4.8  

 

 Absolute Monocytes  0.4 K/ul    0.1-1.0  

 

 Absolute Eosinophils  0.1 K/ul    0.1-0.5  

 

 Absolute Basophils  0.0 K/ul    0.0-0.3  

 

 Hematology Comment (Comm2)  N/A      









 Lipid Panel  2010  [facility] (Do not Use)  Cholesterol  243 mg/dL  
High    38



     [facility]          



     [place] [address] [phone]          









 Triglycerides  117 mg/dL      

 

 HDL  51 mg/dL    35-85  39

 

 Chol/HDL Ratio  4.8 Ratio    3.7-5.6  40

 

 VLDL  23 mg/dL    2-29  

 

 LDL (Calc)  169 mg/dL  High    41









 CMP  2010  [facility] (Do not Use)  Sodium  143 mmol/L    135-144  



     [facility]          



     [place] [address] [phone]          









 Potassium  4.8 mmol/L    3.6-5.2  

 

 Chloride  106 mmol/L      

 

 Carbon Dioxide  30 mmol/L    23-32  

 

 Glucose  83 mg/dL      

 

 BUN  13 mg/dL    6-22  

 

 Creatinine  0.9 mg/dL    0.5-1.3  

 

 BUN/CR  14 Ratio      

 

 Calcium  10.1 mg/dL    8.6-10.2  42

 

 Total Protein  6.4 g/dL    5.8-7.8  

 

 Albumin  4.1 g/dL    3.5-4.8  

 

 Globulin  2.3 g/dL    2.0-3.5  

 

 A/G Ratio  1.8 Ratio    1.0-2.2  

 

 Total Bilirubin  0.5 mg/dL    0.3-1.2  

 

 Alkaline Phosphatase  74 U/L      

 

 Alt  23 U/L    5-45  

 

 Ast  25 U/L    12-40  

 

 Anion Gap  12 mmol/L    8-16  

 

 GFR Calculation  > 60 mL/min      43

 

 GFR For African American  > 60 mL/min      44









 Laboratory test  2010  [facility] (Do not Use)  Vitamin D, 25  71 ng/mL
      



 finding    [facility]  [place] [address] [phone]          

 

 Rast 1 Panel  2009  [facility] (Do not Use)  D Pteronyssinus  0.000    
  45



     [facility] [place] [address] [phone]          









 D Pteronyssinus Class  0 CLASS      

 

 Cat Epithelium Conc  0.000      

 

 Cat Epithelium Class  0 CLASS      

 

 Alternaria Conc  0.000      

 

 Alternaria Class  0 CLASS      

 

 Cladosporium Conc  0.000      

 

 Cladosporium Class  0 CLASS      

 

 Irvin Conc  0.000      

 

 Irvin Class  0 CLASS      

 

 Fremont/Maple Conc  0.000      

 

 Fremont/Maple Class  0 CLASS      

 

 Birch Conc  0.000      

 

 Birch Class  0 CLASS      

 

 Ragweed Conc  0.000      

 

 Ragweed Class  0 CLASS      

 

 Aspergillus Conc  0.000      

 

 Aspergillus Class  0 CLASS      

 

 Hitchcock Conc  0.000      

 

 Pine Class  0 CLASS      

 

 Bermuda Grass Conc  0.000      

 

 Bermuda Grass Class  0 CLASS      

 

 House Dust Conc  0.020      

 

 House Dust Class  0 CLASS      

 

 Lamb's Quarter Conc  0.000      

 

 Lamb's Quarter Class  0 CLASS      

 

 D Farinae Conc  0.000      

 

 D Farinae Class  0 CLASS      

 

 Dog Dander Conc  0.010      

 

 Dog Dander Class  0 CLASS      

 

 Ige, Total  TOTAL IgE IS LES <SEE NOTE> IU/mL      46









 Lipid TX Panel  2009  [facility] (Do not Use)  Ast  20 U/L    12-40  



     [place] [address] [phone]          









 Alt  17 U/L    4-45  

 

 Cholesterol  141 mg/dL      

 

 Triglycerides  110 mg/dL      

 

 HDL  32 mg/dL  Low  35-85  47

 

 LDL (Calc)  87 mg/dL      48

 

 Chol/HDL Ratio  4.4 Ratio    3.7-5.6  49

 

 VLDL  22 mg/dL    2-29  









 Hepatitis C AB  2009  [facility]  Hepatitis C AB  NEGATIVE    (Neg)  50



     [phone]  @        

 

 Laboratory test  2009  [facility] (Do not Use)  Hepatitis C AB  (SEE 
NOTE)    (Neg)  51, 52



 [place] [address] [phone]          

 

 Lipid TX Panel  2009  [facility] (Do not Use)  Ast  25 U/L    12-40  



     [facility]          



     [place] [address] [phone]          









 Alt  26 U/L    4-45  

 

 Cholesterol  175 mg/dL      

 

 Triglycerides  146 mg/dL      

 

 HDL  54 mg/dL    35-85  53

 

 LDL (Calc)  92 mg/dL      54

 

 Chol/HDL Ratio  3.2 Ratio  Low  3.7-5.6  55

 

 VLDL  29 mg/dL    2-29  









 Laboratory test  2008  [facility] (Do not Use)  HCG Quant  <1 MIU/ML   
   56



 finding    [facility]  [facility] [place] [facility] [place]        



     [place] [address] [phone]          

 

 CBC With Auto Diff  2008  [facility] (Do not Use)  WBC  7.5 K/ul    4.0-
10.  



     [facility]        [address] [place] [phone]          









 RBC  4.53 M/ul    4.20-5.40  

 

 Hemoglobin  14.1 GM/dl    12.5-16.0  

 

 Hematocrit  41.2 %    36.0-47.0  

 

 MCV  90.9 FL    80.0-97.0  

 

 MCH  31.1 pg  High  27.0-31.0  

 

 MCHC  34.3 g/dL    32.0-36.0  

 

 RDW  12.7 %    11.5-14.5  

 

 Platelet Count  331 K/ul    140-440  

 

 Neutrophils  62.6 %    50-70  

 

 Lymphocytes  26.7 %    20-44  

 

 Monocytes  8.0 %    2-9  

 

 Eosinophil  2.0 %    0-4  

 

 Basophil  0.7 %    0-2  

 

 Absolute Neutrophils  4.7 K/ul    2.05-7.63  

 

 Absolute Lymphocytes  2.0 K/ul    0.8-4.8  

 

 Absolute Monocytes  0.6 K/ul    0.1-1.0  

 

 Absolute Eosinophils  0.2 K/ul    0.1-0.5  

 

 Absolute Basophils  0.0 K/ul  Low  0.1-0.3  









 Lipid TX Panel  2007  [employer] (Do not Use)  Ast  24 U/L    12-40  57



     [facility]          



     [place] [address] [phone]          









 Alt  21 U/L    4-45  

 

 Cholesterol  171 mg/dL      

 

 Triglycerides  94 mg/dL      

 

 HDL  50 mg/dL    35-85  

 

 LDL (Calc)  102 mg/dL      

 

 Chol/HDL Ratio  3.4 Ratio      

 

 VLDL  19 mg/dL      









 CMP  2007  [employer] (Do not Use)  Sodium  142 mmol/L    135-144  



     [facility]          



     [place] [address] [phone]          









 Potassium  5.0 mmol/L    3.6-5.2  

 

 Chloride  107 mmol/L      

 

 Carbon Dioxide  27 mmol/L    23-33  

 

 Glucose  87 mg/dL      

 

 BUN  17 mg/dL    6-22  

 

 Creatinine  0.9 mg/dL    0.5-1.3  

 

 BUN/CR  19 Ratio    12.0-20.0  

 

 Calcium  9.4 mg/dL    8.6-10.2  

 

 Total Protein  6.5 g/dL    5.8-7.8  

 

 Albumin  3.9 g/dL    3.5-4.8  

 

 Globulin  2.6 g/dL    2.0-3.5  

 

 A/G Ratio  1.5 Ratio    1.0-2.2  

 

 Total Bilirubin  0.4 mg/dL    0.3-1.2  

 

 Alkaline Phosphatase  70 U/L      

 

 Alt  17 U/L    4-45  

 

 Ast  22 U/L    12-40  

 

 Anion Gap  13 mmol/L    8-16  

 

 GFR White Male  96      

 

 GFR White Female  71      

 

 GFR Black Male  116      

 

 GFR Black Female  86      

 

 GFR Guidelines  0      58









 Lipid Panel  2007  [employer] (Do not Use)  Cholesterol  180 mg/dL    50
-199  



     [facility]          



     [place] [address] [phone]          









 Triglycerides  128 mg/dL      

 

 HDL  49 mg/dL    35-85  

 

 Chol/HDL Ratio  3.7 Ratio      

 

 VLDL  26 mg/dL      

 

 LDL (Calc)  105 mg/dL      









 [facility]  2006  [employer] (Do not Use)  Sodium  142 mmol/L    135-144  



     [facility]          



     [place] [address] [phone]          









 Potassium  4.8 mmol/L    3.6-5.2  

 

 Chloride  105 mmol/L      

 

 Carbon Dioxide  30 mmol/L    23-33  

 

 Glucose  85 mg/dL      

 

 BUN  11 mg/dL    6-22  

 

 Creatinine  0.9 mg/dL    0.5-1.3  

 

 BUN/CR  12 Ratio    12.0-20.0  

 

 Calcium  9.8 mg/dL    8.6-10.2  

 

 Total Protein  6.4 g/dL    5.8-7.8  

 

 Albumin  3.9 g/dL    3.5-4.8  

 

 Globulin  2.5 g/dL    2.0-3.5  

 

 A/G Ratio  1.6 Ratio    1.0-2.2  

 

 Total Bilirubin  0.5 mg/dL    0.3-1.2  

 

 Alkaline Phosphatase  63 U/L      

 

 Alt  21 U/L    4-45  

 

 Ast  20 U/L    12-40  

 

 Anion Gap  12 mmol/L    8-16  

 

 GFR White Male  96      

 

 GFR White Female  71      

 

 GFR Black Male  116      

 

 GFR Black Female  86      

 

 GFR Guidelines  0      59









 Lipid Panel  2006  [employer] (Do not Use)  Cholesterol  191 mg/dL    50
-199  



     [facility] [employer]          



     [place] [address] [phone]          









 Triglycerides  147 mg/dL      

 

 HDL  49 mg/dL    35-85  

 

 Chol/HDL Ratio  3.9 Ratio      

 

 VLDL  29 mg/dL      

 

 LDL (Calc)  113 mg/dL      









 Laboratory test  2006  [employer] (Do not Use)  Hepatitis C AB -  
NEGATIVE      



 finding    [facility] [place] [address] [phone]          









 1  Updated reference range on new analyzer 3-2017

 

 2  Updated reference range on new analyzer 3-2017

 

 3  Concerning GFR Guidelines for  Americans:



   Normal function or mild renal disease, if clinically at risk:  >/=60



   mL/min



   Moderately decreased:  30-59



   Severely decreased:  15-29



   Renal failure:  <15

 

 4  Concerning GFR Guidelines:



   Normal function or mild renal disease, if clinically at risk:  >/=60



   mL/min



   Moderately decreased:  30-59



   Severely decreased:  15-29



   Renal failure:  <15



   



   Glomerular Filtration Rate (GFR) is estimated based on the MDRD



   equation, which assumes a steady state for creatinine as recommended



   by the National Kidney Disease Education Program in conjunction with



   the National Institutes of Health and the National Kidney Foundation.



   



   Clinical conditions in which it may be necessary to measure GFR by



   using clearance methods include extremes of age and body size, severe



   malnutrition or obesity, diseases of skeletal muscle, paraplegia or



   quadriplegia, vegetarian diet, rapidly changing kidney function, and



   calculation of the dose of potentially toxic drugs that are excreted



   by the kidneys.

 

 5  Updated Reference Range 

 

 6  Per NCEP ATP III Guidelines:



   Results lower than 40 mg/dL are suggestive of increased risk for



   coronary artery disease.  Results > or=to 60 mg/dL are considered a



   negative risk factor.

 

 7  Per NCEP ATP III Guidelines:



   Normal Population <130



   Patients with medical conditions: CHD/DM



   Optimal: <100



   Borderline high: 130-159



   High: 160-189



   Very high: >189

 

 8  [facility]



   [address] [place] [address]  [address]



   Tel: [phone]  Fax:  [phone]



   



   CYTOLOGY REPORT



   



   Accession #: [id number]



   



   Source of Specimen(s): SurePath Cervical / Endocervical Pap Smear -



   One



   Vial



   



   Date of Last Menstrual Period:  None Provided



   Other Clinical Conditions:  REFLEX TO HPV ASSAY IF RESULTS OF



   THIS



   PAP ARE ASCUS



   



   



   



   Specimen Adequacy



   SATISFACTORY FOR EVALUATION



   SCANT/NO ENDOCERVICAL COMPONENT



   



   General Categorization



   NEGATIVE FOR INTRAEPITHELIAL LESION OR MALIGNANCY



   



   Interpretation



   NEGATIVE FOR INTRAEPITHELIAL LESION OR MALIGNANCY



   Atrophy with inflammation (atrophic vaginitis)



   



   



   



   



   Reported: 3/29/2018 12:28



   ***Electronically Signed Out By Lizbeth Blue MS,SCT(ASCP)([facility])***



   



   karuna CLARK CT(ASCP)



   [facility]



   



   ICD9 Code:



   Z12.4



   CPT code:



   A: [id number]



   



   QC Reviewed: Y



   Unless otherwise specified, testing performed by [employer]



   [facility] [address]  [address]

 

 9  This sample is drawn by:INCOLA/LPN

 

 10  Per NCEP ATP III Guidelines:



   Results lower than 40 mg/dL are suggestive of increased risk for



   coronary artery disease.  Results > or=to 60 mg/dL are considered a



   negative risk factor.

 

 11  Per NCEP ATP III Guidelines:



   Normal Population <130



   Patients with medical conditions: CHD/DM



   Optimal: <100



   Borderline high: 130-159



   High: 160-189



   Very high: >189

 

 12  Concerning GFR Guidelines for  Americans:



   Normal function or mild renal disease, if clinically at risk:  >/=60



   mL/min



   Moderately decreased:  30-59



   Severely decreased:  15-29



   Renal failure:  <15

 

 13  Concerning GFR Guidelines:



   Normal function or mild renal disease, if clinically at risk:  >/=60



   mL/min



   Moderately decreased:  30-59



   Severely decreased:  15-29



   Renal failure:  <15



   



   Glomerular Filtration Rate (GFR) is estimated based on the MDRD



   equation, which assumes a steady state for creatinine as recommended



   by the National Kidney Disease Education Program in conjunction with



   the National Institutes of Health and the National Kidney Foundation.



   



   Clinical conditions in which it may be necessary to measure GFR by



   using clearance methods include extremes of age and body size, severe



   malnutrition or obesity, diseases of skeletal muscle, paraplegia or



   quadriplegia, vegetarian diet, rapidly changing kidney function, and



   calculation of the dose of potentially toxic drugs that are excreted



   by the kidneys.

 

 14  [facility] [employer] [facility]  [employer].



   [address] [place] [address]



   Tel: [phone]  Fax:  [phone]



   



   CYTOLOGY REPORT



   



   Accession #: [id number]



   



   Source of Specimen(s): SurePath Vaginal / Cervical Pap Smear - One



   Vial



   



   Date of Last Menstrual Period:  None Provided



   Treatment History:  Hysterectomy: 



   Other Clinical Conditions:  Last Pap Smear:  normal



   REFLEX TO HPV ASSAY IF RESULTS OF THIS PAP ARE ASCUS



   



   



   



   Specimen Adequacy



   SATISFACTORY FOR EVALUATION



   



   General Categorization



   NEGATIVE FOR INTRAEPITHELIAL LESION OR MALIGNANCY



   



   Interpretation



   NEGATIVE FOR INTRAEPITHELIAL LESION OR MALIGNANCY



   



   



   



   



   Reported: 2017 09:52     ***Electronically Signed Out By Lizbeth Blue MS,SCT(ASCP)([facility])***



   [facility]



   



   



   



   ICD9 Code:



   R53.83 Z01.419



   Unless otherwise specified, testing performed by [employer]  [address] [place], [place] [id number]

 

 15  This sample is drawn by:KD/CMA

 

 16  A Negative serologic test for Lyme Disease



   indicates no serologic evidence of infection



   with B burgdorferi at the time this specimen



   was collected.  A repeat specimen should be



   collected in 2 to 4 weeks if clinically



   indicated.



   Unless otherwise specified, testing performed by [employer]  [address] [place] [address]  [address]

 

 17  Reference range: 0 to 19



   Unit: Units



   



   INTERPRETIVE INFORMATION: Cyclic Citrullinated Peptide Antibody,



   IgG



   19 Units or less ................... Negative



   20-39 Units ........................ Weak Positive



   40-59 Units ........................ Moderate Positive



   60 Units or greater ................ Strong Positive



   Anti-cyclic citrullinated peptide (anti-CCP), IgG antibodies are



   present in about 69-83 percent of patients with rheumatoid



   arthritis (RA) and have specificities of 93-95 percent. These



   autoantibodies may be present in the preclinical phase of disease,



   are associated with future RA development, and may predict



   radiographic joint destruction. Patients with weak positive



   results should be monitored and testing repeated.



   Performed by [employer],



   [address] [place] [phone]



   [email or web address], Gabe Looney MD, Lab. Director



   Unless otherwise specified, testing performed by [employer]  [address] [place] [address]  [address]

 

 18  This sample is drawn by:LS/LPN

 

 19  ASSAY BY IMMUNOCHEMILUMINOMETRIC ASSAY



   ON THE SIEMENS IMMULITE 2000 XPi.  VALUES



   OBTAINED WITH DIFFERENT METHODS OR KITS



   CANNOT BE USED INTERCHANGEABLY FOR PATIENT



   MONITORING.  RESULTS CANNOT BE INTERPRETED



   AS ABSOLUTE EVIDENCE OF THE PRESENCE OR



   ABSENCE OF MALIGNANCY.  THE TEST IS NOT



   INTERPRETABLE IN PREGNANCY.



   



   AFP CAN BE INCREASED IN A VARIETY OF



   BENIGN DISEASES.  SPECIFICITY FOR THE



   DETECTION OF MALIGNANCY INCREASES WITH



   INCREASING LEVELS.



   Unless otherwise specified, testing performed by [employer]  [address] [place] [address]  [address]

 

 20  Concerning GFR Guidelines for  Americans:



   Normal function or mild renal disease, if clinically at risk:  >/=60



   mL/min



   Moderately decreased:  30-59



   Severely decreased:  15-29



   Renal failure:  <15

 

 21  Concerning GFR Guidelines:



   Normal function or mild renal disease, if clinically at risk:  >/=60



   mL/min



   Moderately decreased:  30-59



   Severely decreased:  15-29



   Renal failure:  <15



   



   Glomerular Filtration Rate (GFR) is estimated based on the MDRD



   equation, which assumes a steady state for creatinine as recommended



   by the National Kidney Disease Education Program in conjunction with



   the National Institutes of Health and the National Kidney Foundation.



   



   Clinical conditions in which it may be necessary to measure GFR by



   using clearance methods include extremes of age and body size, severe



   malnutrition or obesity, diseases of skeletal muscle, paraplegia or



   quadriplegia, vegetarian diet, rapidly changing kidney function, and



   calculation of the dose of potentially toxic drugs that are excreted



   by the kidneys.

 

 22  Per NCEP ATP III Guidelines:



   Results lower than 40 mg/dL are suggestive of increased risk for



   coronary artery disease.  Results > or=to 60 mg/dL are considered a



   negative risk factor.

 

 23  Per NCEP ATP III Guidelines:



   Normal Population <130



   Patients with medical conditions: CHD/DM



   Optimal: <100



   Borderline high: 130-159



   High: 160-189



   Very high: >189

 

 24  [facility].



   [address] [place] [address]  [address]



   Tel# [phone]  Fax#  [phone]



   



   GYNECOLOGIC CYTOLOGY REPORT



   



   Accession Number: [id number]



   Source of Specimen(s): A: SurePath Vaginal Pap Smear - One Vial



   



   Clinical Diagnosis and History:



   



   Date of Last Menstrual Period:  None Provided



   Treatment History:  Hysterectomy



   Other Clinical Conditions:  Last Pap Smear: 



   REFLEX TO DIGENE HPV ASSAY IF RESULTS OF THIS PAP ARE ASCUS



   Specimen Adequacy



   Satisfactory for evaluation



   



   General Categorization



   Negative for intraepithelial lesion or malignancy



   



   Interpretation



   NEGATIVE FOR INTRAEPITHELIAL LESION OR MALIGNANCY



   



   



   Reported: 2012



   ***Electronically Signed Out By Lizbeth Blue SCT(ASC)([facility])***



   Cytotechnologist:  Deja Mcdonald CT([facility])



   [facility]



   [facility]



   jc



   



   QC Reviewed: Y



   Unless otherwise specified, testing performed by [employer]



    [employer] [address]  [address]

 

 25  [facility].



   [address] [place] [address]



   Tel# [phone]  Fax#  [phone]



   



   GYNECOLOGIC CYTOLOGY REPORT



   



   Accession Number: [id number]



   Source of Specimen(s): A: SurePath Vaginal/ Cervical/ Endocervical Pap



   Smear - One Vial



   



   Clinical Diagnosis and History:



   



   Date of Last Menstrual Period:  None Provided



   Other Clinical Conditions:  DIGENE HPV ASSAY REQUESTED



   Specimen Adequacy



   Satisfactory for evaluation



   Absence of endocervical/transformation zone component



   



   General Categorization



   Negative for intraepithelial lesion or malignancy



   



   Interpretation



   NEGATIVE FOR INTRAEPITHELIAL LESION OR MALIGNANCY



   



   Recommendations



   HPV testing will be performed via Digene Hybrid Capture II and a



   separate



   report will be issued.



   



   



   Reported: 2011



   ***Electronically Signed Out By Lizbeth Blue SCT([facility])(IAC)***



   Cytotechnologist:  Deja LINO([facility])



   [facility]



   [facility]



   jc



   



   QC Reviewed: Y



   Unless otherwise specified, testing performed by [facility]



    [employer]  [address] [place] [address]  [address]

 

 26  Unless otherwise specified, testing performed by [employer].



   [address] [place] [address]

 

 27  



   [employer] [address]  [address]



   Tel# [phone]  Fax# [phone]



   



   [employer]ene Hybrid Capture II HPV Test



   



   Accession Number [id number]



   Specimen(s) Received



    A: Digene SP Vaginal/ Cervical/ Endocervical Pap Smear - One Vial



   



    Other Case Numbers: [id number]



   



   



   



   Diagnosis



   



   



   RISK GROUPS     HPV TYPES



            RESULTS



   



   



   High Risk           (16, 18, 31, 33, 35, 39, 45, 51, 52, 56, 58, 59, 68)



   POSITIVE



   



   



   Comments



   The presence of High Risk HPV types is usually associated with a



   high/intermediate risk for development or progression to invasive cancer



   of the cervix.



   



   



   The performance characteristics of the SurePath cell pellet specimen



   tested for this assay were validated by [facility] and



   licensed for use by the [place] Department of Health.  This test



   has not been licensed by the FDA and the result is not intended to be used



   as the sole means for clinical diagnosis or patient management.  Negative



   results do not rule out the presence of disease.



   



   Reported: 6/15/2011 16:52     ***Electronically Signed Out By Carol Turner***



   ckl

 

 28  FASTING 12 HOUR This sample is drawn by:GM,LPN

 

 29  Concerning GFR GUIDELINES:



   Normal Function or Mild Renal Disease, if clinically at risk: >/=60mL/min



   Moderately decreased:                                                       
30-59



   Severely decreased:                                                         
  15-29



   Renal Failure:                                                              
        <15



   



   Glomerular Filtration Rate (GFR) is estimated based on the MDRD equation, 
which assumes a steady state for creatinine as



   recommended by the National Kidney Disease Education Program in conjunction 
with the National Institutes of Health and the



   National Kidney Foundation.



   



   Clinical conditions in which it may be necessary to measure GFR by using 
clearance methods include extremes of age and body size, severe malnutrition or 
obesity, diseases of skeletal muscle, paraplegia



   or quadriplegia, vegetarian diet, rapidly changing kidney function, and 
calculation of the dose of potentially toxic drugs that are excreted by the 
kidneys.

 

 30  Concerning GFR GUIDELINES:



   Normal Function or Mild Renal Disease, if clinically at risk: >/=60mL/min



   Moderately decreased:                                                       
30-59



   Severely decreased:                                                         
  15-29



   Renal Failure:                                                              
        <15

 

 31  PER NCEP ATP III GUIDELINES:



   RESULTS LOWER THAN 40 MG/DL ARE SUGGESTIVE OF INCREASED RISK



   FOR CORONARY ARTERY DISEASE.  RESULTS > OR=TO 60 MG/DL  ARE



   CONSIDERED A NEGATIVE RISK FACTOR.

 

 32  INTERPRETATION OF CHOL-HDL RATIO



   



   CHD RISK                         FEMALE                   MALE



   VERY HIGH                         >8.3                    >14.3



   HIGH                              5.6 - 8.3              6.7 - 14.3



   AVERAGE                           3.7 - 5.6               4.0 - 6.7



   BELOW AVERAGE                     2.5 - 3.7               2.7 - 4.0



   PROTECTED                         <2.5                    <2.7

 

 33  PER NCEP ATP III GUIDELINES:



   OPTIMAL: <100



   NEAR OPTIMAL: 100 - 129



   BORDERLINE HIGH: 130 - 159



   HIGH: 160 - 189



   VERY HIGH: >189

 

 34  The difference between the most recent result of 243 and the current result



   of 176 exceeds the absolute delta value of 50 as defined for this test.

 

 35  PER NCEP ATP III GUIDELINES:



   RESULTS LOWER THAN 40 MG/DL ARE SUGGESTIVE OF INCREASED RISK



   FOR CORONARY ARTERY DISEASE.  RESULTS > OR=TO 60 MG/DL  ARE



   CONSIDERED A NEGATIVE RISK FACTOR.

 

 36  PER NCEP ATP III GUIDELINES:



   OPTIMAL: <100



   NEAR OPTIMAL: 100 - 129



   BORDERLINE HIGH: 130 - 159



   HIGH: 160 - 189



   VERY HIGH: >189

 

 37  INTERPRETATION OF CHOL-HDL RATIO



   



   CHD RISK                         FEMALE                   MALE



   VERY HIGH                         >8.3                    >14.3



   HIGH                              5.6 - 8.3              6.7 - 14.3



   AVERAGE                           3.7 - 5.6               4.0 - 6.7



   BELOW AVERAGE                     2.5 - 3.7               2.7 - 4.0



   PROTECTED                         <2.5                    <2.7

 

 38  RESULT CONFIRMED



   



   The difference between the most recent result of 141 and the current result



   of 243 exceeds the absolute delta value of 50 as defined for this test.

 

 39  PER NCEP ATP III GUIDELINES:



   RESULTS LOWER THAN 40 MG/DL ARE SUGGESTIVE OF INCREASED RISK



   FOR CORONARY ARTERY DISEASE.  RESULTS > OR=TO 60 MG/DL  ARE



   CONSIDERED A NEGATIVE RISK FACTOR.

 

 40  INTERPRETATION OF CHOL-HDL RATIO



   



   CHD RISK                         FEMALE                   MALE



   VERY HIGH                         >8.3                    >14.3



   HIGH                              5.6 - 8.3              6.7 - 14.3



   AVERAGE                           3.7 - 5.6               4.0 - 6.7



   BELOW AVERAGE                     2.5 - 3.7               2.7 - 4.0



   PROTECTED                         <2.5                    <2.7

 

 41  PER NCEP ATP III GUIDELINES:



   OPTIMAL: <100



   NEAR OPTIMAL: 100 - 129



   BORDERLINE HIGH: 130 - 159



   HIGH: 160 - 189



   VERY HIGH: >189

 

 42  The difference between the most recent result of 9.4 and the current result



   of 10.1 exceeds the absolute delta value of 0.3 as defined for this test.

 

 43  Concerning GFR GUIDELINES:



   Normal Function or Mild Renal Disease, if clinically at risk: >/=60mL/min



   Moderately decreased:                                                       
30-59



   Severely decreased:                                                         
  15-29



   Renal Failure:                                                              
        <15



   



   Glomerular Filtration Rate (GFR) is estimated based on the MDRD equation, 
which assumes a steady state for creatinine as



   recommended by the National Kidney Disease Education Program in conjunction 
with the National Institutes of Health and the



   National Kidney Foundation.



   



   Clinical conditions in which it may be necessary to measure GFR by using 
clearance methods include extremes of age and body size, severe malnutrition or 
obesity, diseases of skeletal muscle, paraplegia



   or quadriplegia, vegetarian diet, rapidly changing kidney function, and 
calculation of the dose of potentially toxic drugs that are excreted by the 
kidneys.

 

 44  Concerning GFR GUIDELINES:



   Normal Function or Mild Renal Disease, if clinically at risk: >/=60mL/min



   Moderately decreased:                                                       
30-59



   Severely decreased:                                                         
  15-29



   Renal Failure:                                                              
        <15

 

 45  Effective 03 all allergy testing is performed using the [employer] 288 
analyzer.

 

 46  TOTAL IgE IS LESS THAN 7 IU/ml

 

 47  PER NCEP ATP III GUIDELINES:



   RESULTS LOWER THAN 40 MG/DL ARE SUGGESTIVE OF INCREASED RISK



   FOR CORONARY ARTERY DISEASE.  RESULTS > OR=TO 60 MG/DL  ARE



   CONSIDERED A NEGATIVE RISK FACTOR.

 

 48  PER NCEP ATP III GUIDELINES:



   OPTIMAL: <100



   NEAR OPTIMAL: 100 - 129



   BORDERLINE HIGH: 130 - 159



   HIGH: 160 - 189



   VERY HIGH: >189

 

 49  INTERPRETATION OF CHOL-HDL RATIO



   



   CHD RISK                         FEMALE                   MALE



   VERY HIGH                         >8.3                    >14.3



   HIGH                              5.6 - 8.3              6.7 - 14.3



   AVERAGE                           3.7 - 5.6               4.0 - 6.7



   BELOW AVERAGE                     2.5 - 3.7               2.7 - 4.0



   PROTECTED                         <2.5                    <2.7

 

 50  



   NOT INFECTED WITH HCV, UNLESS RECENT



   INFECTION IS SUSPECTED OR OTHER EVIDENCE



   EXISTS TO INDICATE HCV INFECTION.

 

 51  FASTING This sample is drawn by:GM,LPN

 

 52  HEPATITIS C AB @         NEGATIVE [NEG]                         ([address])



   



   NOT INFECTED WITH HCV, UNLESS RECENT



   INFECTION IS SUSPECTED OR OTHER EVIDENCE



   EXISTS TO INDICATE HCV INFECTION.

 

 53  PER NCEP ATP III GUIDELINES:



   RESULTS LOWER THAN 40 MG/DL ARE SUGGESTIVE OF INCREASED RISK



   FOR CORONARY ARTERY DISEASE.  RESULTS > OR=TO 60 MG/DL  ARE



   CONSIDERED A NEGATIVE RISK FACTOR.

 

 54  PER NCEP ATP III GUIDELINES:



   OPTIMAL: <100



   NEAR OPTIMAL: 100 - 129



   BORDERLINE HIGH: 130 - 159



   HIGH: 160 - 189



   VERY HIGH: >189

 

 55  INTERPRETATION OF CHOL-HDL RATIO



   



   CHD RISK                         FEMALE                   MALE



   VERY HIGH                         >8.3                    >14.3



   HIGH                              5.6 - 8.3              6.7 - 14.3



   AVERAGE                           3.7 - 5.6               4.0 - 6.7



   BELOW AVERAGE                     2.5 - 3.7               2.7 - 4.0



   PROTECTED                         <2.5                    <2.7

 

 56  INTERPRETATION:



   LESS THAN 6           NEGATIVE



   6  -  10       BORDERLINE (SUGGEST



   REPEAT IN 48 HOURS)



   _____________________________________



   APPROX HCG RANGE    WEEKS POST LMP



   11 -    130       3 -  4 WEEKS



   75 -   2600       4 -  5 WEEKS



   850 -  09425       5 -  6 WEEKS



   4000 - [id number]       6 -  7 WEEKS



   19397 - [id number]       7 - 12 WEEKS



   06089 - [id number]      12 - 16 WEEKS



   1400 -  80676      16 - 29 WEEKS



   940 -  44122      29 - 41 WEEKS



   HCG QUANT INTERPRETATION:



   LESS THAN 6          NEGATIVE



   6-10                          BORDERLINE REPEAT IN 48HRS



   



   



   APPROX HCG RANGE                                      WEEKS POST LMP



                                                                    3-4 
WEEKS



                                                                  4-5 
WEEKS



   [phone]                                                           5-6 WEEKS



   [phone]                                                       6-7 WEEKS



   [id number]                                                     7-12 WEEKS



   [id number]                                                     12-16 WEEKS



   [phone]                                                         16-29 
WEEKS



   [phone]                                                            29-41 
WEEKS

 

 57  FASTING

 

 58  Normal Function or Mild Renal Disease, if clinically at risk:  >/=60 mL/min



   Moderately decreased:                                                        
30-59



   Severely decreased:                                                         
   15-29



   Renal Failure:                                                              
          <15



   



   Glomerular Filtration Rate (GFR) is estimated based on the MDRD equation, 
which assumes a steady state for creatinine as recommended by the National 
Kidney Disease Education Program in conjunction with



   the National Institutes of Health and the National Kidney Foundation.



   



   Clinical conditions in which it may be necessary to measure GFR by using 
clearance methods include extremes of age and body size, severe malnutrition or 
obesity, diseases of skeletal muscle, paraplegia



   or quadriplegia, vegetarian diet, rapidly changing kidney function, and 
calculation of the dose of potentially toxic drugs that are excreted by the 
kidneys.

 

 59  Normal Function or Mild Renal Disease, if clinically at risk:  >/=60 mL/min



   Moderately decreased:                                                        
30-59



   Severely decreased:                                                         
   15-29



   Renal Failure:                                                              
          <15



   



   Glomerular Filtration Rate (GFR) is estimated based on the MDRD equation, 
which assumes a steady state for creatinine as recommended by the National 
Kidney Disease Education Program in conjunction with



   the National Institutes of Health and the National Kidney Foundation.



   



   Clinical conditions in which it may be necessary to measure GFR by using 
clearance methods include extremes of age and body size, severe malnutrition or 
obesity, diseases of skeletal muscle, paraplegia



   or quadriplegia, vegetarian diet, rapidly changing kidney function, and 
calculation of the dose of potentially toxic drugs that are excreted by the 
kidneys.







Procedures







 Date  Code  Description  Status

 

 10/10/2018  47078500  Mammogram  Completed

 

 2014  11756  Measure Blood Oxygen Level Single Determination  Completed

 

 2013  43597  Measure Blood Oxygen Level Single Determination  Completed

 

 2013  083085503  Bone Mineral Density Test  Completed

 

 2012  87566  Measure Blood Oxygen Level Single Determination  Completed

 

 2012  80427212  Colonoscopy  Completed

 

 10/24/2011  44292  Electrocardiogram Complete  Completed

 

 2011  94454  Measure Blood Oxygen Level Single Determination  Completed

 

 2011  77661613  Colonoscopy  Completed

 

 2011  49498271  Mammogram  Completed

 

 2009  75836  Spirometry /PFT W/O Bronchodialator  Completed

 

 2009  12998  Electrocardiogram Complete  Completed

 

 2008  99435  Electrocardiogram Complete  Completed







Encounters







 Type  Date  Location  Provider  Dx  Diagnosis

 

 Office Visit  2018  Shailesh Whyte,  R21  Rash and other



   7:45a    N.P.    nonspecific skin



           eruption









 H92.01  Otalgia, RIGHT ear









 Office Visit  2018  8:00a  Shailesh Whyte, N.P.  M54.2  
Cervicalgia









 M54.31  Sciatica, RIGHT side









 Office Visit  2018  8:00a  Shailesh Whyte,  J06.9  Acute upper



       N.P.    respiratory



           infection,



           unspecified

 

 Office Visit  2018 10:30a  Shailesh Whyte,  H92.02  Otalgia, 
LEFT ear



       N.P.    









 M54.2  Cervicalgia









 Office Visit  2018  8:15a  Shailesh Whyte,  Z12.4  Encounter 
for



       N.P.    screening for



           malignant neoplasm



           of cervix









 N76.0  Acute vaginitis

 

 E78.2  Mixed hyperlipidemia

 

 Z01.419  Encntr for gyn exam (general) (routine) w/o abn findings

 

 M25.511  Pain in RIGHT shoulder

 

 M19.91  Primary osteoarthritis, unspecified site









 Office Visit  2018  1:15p  Shailesh Whyte,  J01.00  Acute 
maxillary



       N.P.    sinusitis,



           unspecified









 K64.9  Unspecified hemorrhoids









 Office Visit  2017  1:15p  Shailesh Whyte,  J02.0  
Streptococcal



       N.P.    pharyngitis

 

 Office Visit  2017  3:15p  Shailesh Whyte,  M54.5  Low back 
pain



       N.P.    

 

 Office Visit  2017  9:15a  Shailesh Whyte,  R53.83  Other 
fatigue



       N.P.    









 E83.51  Hypocalcemia

 

 I10  Essential (primary) hypertension

 

 Z01.419  Encntr for gyn exam (general) (routine) w/o abn findings









 Office Visit  2016  2:15p  Shailesh Whyte,  J20.9  Acute 
bronchitis,



       N.P.    unspecified









 H92.01  Otalgia, RIGHT ear

 

 S00.451A  Superficial foreign body of RIGHT ear, initial encounter









 Office Visit  2016  1:00p  Shailesh Whyte,  J01.00  Acute 
maxillary



       N.P.    sinusitis,



           unspecified









 Z23  Encounter for immunization









 Office Visit  2016 11:00a  Shailesh Whyte,  F33.1  Major 
depressive



       N.P.    disorder, recurrent,



           moderate









 J01.00  Acute maxillary sinusitis, unspecified

 

 H92.01  Otalgia, RIGHT ear









 Office Visit  2016  1:15p  Shailesh Whyte,  M70.929  Unsp 
soft tissue



       N.P.    disord related to



           use/pressure, unsp up



           arm









 F33.1  Major depressive disorder, recurrent, moderate









 Office Visit  2016  9:45a  Shailesh Whyte  M70.929  Unsp 
soft tissue



       N.P.    disord related to



           use/pressure, unsp up



           arm

 

 Office Visit  2016  1:30p  Shailesh Whyte,  I10  Essential (
primary)



       N.P.    hypertension









 E78.2  Mixed hyperlipidemia

 

 K64.9  Unspecified hemorrhoids

 

 Z23  Encounter for immunization









 Office Visit  2016  1:15p  Shailesh Whyte,  M25.572  Pain in 
LEFT ankle



       N.P.    and joints of LEFT



           foot

 

 Office Visit  2015  3:45p  Shailesh Whyte,  J01.00  Acute 
maxillary



       N.P.    sinusitis,



           unspecified









 H92.01  Otalgia, RIGHT ear

 

 S00.452A  Superficial foreign body of LEFT ear, initial encounter









 Office Visit  10/20/2015  2:00p  Shailesh Whyte,  S29.012A  Strain 
of muscle



       N.P.    and tendon of



           back wall of



           thorax, init









 T78.40xA  Allergy, unspecified, initial encounter

 

 Z28.21  Immunization not carried out because of patient refusal









 Office Visit  2015  1:00p  Shailesh Whyte,  719.89  Joint 
Disorder



       N.P.    Other Spec Multiple



           Sites









 733.09  Osteoporosis Other









 Office Visit  2015  1:45p  Shailesh Whyte,  V74.5  Screening 
Examination



       N.P.    Venereal Disease









 401.1  Hypertension Benign

 

 272.2  Hyperlipidemia Mixed

 

 461.0  Sinusitis Acute Maxillary

 

 309.28  Adjustment Disorder With Anxiety/Depression

 

 789.9  Abdomen & Pelvis Symptoms Other

 

 780.79  Malaise And Fatigue Other

 

 V73.89  Screening Examination Viral Diseases Other Spec









 Office Visit  2014  3:30p  Shailesh Whyte, N.P.  723.1  
Cervicalgia









 296.32  Depressive Disorder Major Recurrent Moderate

 

 V64.06  Vaccination Not Carried Out Because Of Patient Refusal









 Office Visit  2014 10:15a  Shailesh Whyte, N.P.  466.0  
Bronchitis Acute









 786.2  Cough









 Office Visit  10/10/2013  8:15a  Shailesh Whyte, N.P.  723.1  
Cervicalgia









 723.1  Cervicalgia

 

 V04.81  Need For Prophylactic Vaccination & Inoculation/Influenza

 

 723.1  Cervicalgia









 Office Visit  2013 10:45a  Shailesh Whyte, N.P.  466.0  
Bronchitis Acute









 786.2  Cough









 Office Visit  01/15/2013  4:45p  Shailesh Whyte,  461.0  Sinusitis 
Acute



       N.P.    Maxillary

 

 Office Visit  2012 11:45a  Shailesh Whyte,  466.0  Bronchitis 
Acute



       N.P.    

 

 Office Visit  2012  2:15p  Shailesh Whyte,  455.0  
Hemorrhoids Internal



       N.P.    W/O Complication









 455.8  Hemorrhoids Unspec W/ Other Complications









 Office Visit  2012  1:00p  Shailesh Whyte,  V72.31  Routine 
Gyn



       N.P.    Examination









 V74.5  Screening Examination Venereal Disease









 Office Visit  2012 11:30a  Shailesh Whyte,  726.19  Shoulder 
Disorders



       N.P.    Other Spec









 723.1  Cervicalgia









 Office Visit  10/24/2011  1:45p  Shailesh Whyte,  780.4  Dizziness &



       N.P.    Giddiness

 

 Office Visit  2011  2:45p  Shailesh Whyte,  V72.31  Routine 
Gyn



       N.P.    Examination









 V74.5  Screening Examination Venereal Disease









 Office Visit  2011  1:45p  Shailesh Whyte,  466.0  Bronchitis 
Acute



       N.P.    

 

 Office Visit  03/10/2011  3:30p  Shailesh Whyte,  473.0  Sinusitis 
Chronic



       N.P.    Maxillary

 

 Office Visit  2011  1:15p  Shailesh Whyte,  473.0  Sinusitis 
Chronic



       N.P.    Maxillary









 723.1  Cervicalgia









 Office Visit  02/15/2011  8:15a  Shailesh Whyte,  789.9  Abdomen & 
Pelvis



       N.P.    Symptoms Other









 461.0  Sinusitis Acute Maxillary









 Office Visit  10/20/2010  3:30p  Shailesh Whyte,  569.3  Hemorrhage 
Rectum &



       N.P.    Anus

 

 Office Visit  10/06/2010  3:30p  Shailesh Whyte,  455.0  
Hemorrhoids Internal



       N.P.    W/O Complication

 

 Office Visit  2010  1:00p  Shailesh Whyte,  719.41  Pain 
Joint Shoulder



       N.P.    Region









 726.19  Shoulder Disorders Other Spec

 

 726.19  Shoulder Disorders Other Spec









 Office Visit  2010  3:45p  Shailesh Whyte,  296.31  
Depressive Disorder



       N.P.    Major Recurrent Mild

 

 Office Visit  2010  3:15p  Shailesh Whyte,  296.31  
Depressive Disorder



       N.P.    Major Recurrent Mild

 

 Office Visit  2010  8:30a  Shailesh Whyte,  296.31  
Depressive Disorder



       N.P.    Major Recurrent Mild

 

 Office Visit  2010  3:45p  Shailesh Whyte,  709.9  Skin & 
Subcutaneous



       N.P.    Tissue Disorders



           Unspec









 296.31  Depressive Disorder Major Recurrent Mild









 Office Visit  2009  8:15a  Shailesh Whyte,  477.0  Rhinitis 
Allergic



       N.P.    Due To Pollen









 726.19  Shoulder Disorders Other Spec

 

 461.0  Sinusitis Acute Maxillary

 

 719.41  Pain Joint Shoulder Region

 

 461.0  Sinusitis Acute Maxillary









 Office Visit  2009  3:30p  Dimas Wood,  V72.83  
Examination



       MD    Preoperative Other



           Spec









 V72.84  Examination Preoperative Unspec

 

 477.0  Rhinitis Allergic Due To Pollen

 

 493.00  Asthma Extrinsic Unspecified

 

 272.2  Hyperlipidemia Mixed









 Office Visit  2009  9:45a  Shailesh Whyte,  461.0  Sinusitis 
Acute



       N.P.    Maxillary

 

 Office Visit  2009  4:00p  Shailesh Whyte,  461.0  Sinusitis 
Acute



       N.P.    Maxillary

 

 Office Visit  2009 12:30p  Shailesh Whyte,  719.41  Pain 
Joint Shoulder



       N.P.    Region

 

 Office Visit  2008  2:00p  Rosana Jones,  V70.0  Exam (Adult
)



       DONATO MS FNP    General Medical



           Routine [facility]



           Care Facility









 272.2  Hyperlipidemia Mixed

 

 599.7  Hematuria

 

 733.00  Osteoporosis Unspec

 

 V72.84  Examination Preoperative Unspec









 Office Visit  2008  8:30a  Shailesh Whyte,  626.9  
Menstruation & Other



       N.P.    Abnormal Bleeding



           Disorders Unspec









 218.9  Leiomyoma Uterus Unspec









 Office Visit  2008  3:00p  Shailesh Whyte,  719.41  Pain 
Joint Shoulder



       N.P.    Region









 723.1  Cervicalgia









 Office Visit  2007  8:15a  Shailesh Whyte,  461.0  Sinusitis 
Acute



       N.P.    Maxillary

 

 Office Visit  10/04/2007  1:40p  Flaco Montez,  719.41  Pain 
Joint Shoulder



       MD    Region









 723.1  Cervicalgia









 Office Visit  2007  3:15p  Shailesh Whyte,  719.41  Pain 
Joint Shoulder



       N.P.    Region

 

 Office Visit  2007  4:15p  Shailesh Whyte,  691.8  Dermatitis 
Atopic &



       N.P.    Related Conditions



           Other

 

 Office Visit  2007  3:45p  Shailesh Whyte,  272.2  
Hyperlipidemia Mixed



       N.P.    









 719.41  Pain Joint Shoulder Region

 

 530.81  Esophageal Reflux

 

 723.1  Cervicalgia









 Office Visit  2007  3:30p  Shailesh Whyte,  272.2  
Hyperlipidemia Mixed



       N.P.    









 719.41  Pain Joint Shoulder Region

 

 530.81  Esophageal Reflux

 

 723.1  Cervicalgia









 Office Visit  10/19/2006  3:30p  Shailesh Whyte,  719.41  Pain 
Joint Shoulder



       N.P.    Region

 

 Office Visit  2006  4:15p  Shailesh Whyte,  719.41  Pain 
Joint Shoulder



       N.P.    Region









 719.41  Pain Joint Shoulder Region

 

 723.1  Cervicalgia

 

 723.1  Cervicalgia









 Office Visit  2006 10:30a  Shailesh Whyte,  272.2  
Hyperlipidemia Mixed



       N.P.    









 054.9  Herpes Simplex W/O Complication

 

 477.0  Rhinitis Allergic Due To Pollen

 

 733.00  Osteoporosis Unspec







Plan of Treatment

2019 - Shailesh Tse, N.P.J20.9 Acute bronchitis, unspecifiedComments:
meds as rx'd, increase fluids and restzithromax  as rx'd, increase fluids and 
rest report non-resolution 3-5 daysAllNew Medication:Zithromax 250 mg - 2 po 
day one then 1 tab po qd x 4 daysWork Note   - no work 19-19 for 
medical reasons

## 2019-01-22 NOTE — ED
Shortness of Breath





- HPI Summary


HPI Summary: 





This patient is a 58 year old female who has had flu like sx for the last 6 

days. She c/o HA, vomiting, nasal congestion, cough, fever, and chills. She did 

see PCP and was taken out of work, dx with bronchitis, and given a z jonny, she 

did not take it all due to ABD cramping and diarrhea. She states her cough has 

improved with this. Today she is concerned as she is SOB and it is scaring her. 

Pt states she has had the cough intermittently for months but that it is worse 

in the past week.  The patient also has chest discomfort secondary to coughing.

  She rates the pain 5/10. Pt report decreased PO intake. The patient got her 

flu shot in September of 2018. She does have asthma but does not have an 

inhaler. She did smoke for 30 years but quit 33 years ago. 











- History of Current Complaint


Chief Complaint: EDUpperRespComplaint


Time Seen by Provider: 01/22/19 11:23


Hx Obtained From: Patient


Onset/Duration: Lasting Days - 6, Still Present


Timing: Constant


Current Severity: Mild


Dyspnea At: Exertion


Associated Signs & Symptoms: Chest Pain w/Cough





- Allergy/Home Medications


Allergies/Adverse Reactions: 


 Allergies











Allergy/AdvReac Type Severity Reaction Status Date / Time


 


amoxicillin Allergy  Anaphylatic Verified 01/22/19 11:22





   Shock  


 


Penicillins Allergy  Anaphylatic Verified 01/22/19 11:22





   Shock  











Home Medications: 


 Home Medications





Atorvastatin* [Lipitor 10 MG*] 10 mg PO QPM 01/22/19 [History Confirmed 01/22/19

]


Calcium Citrate TAB* [Citracal TAB*] 600 mg PO DAILY 01/22/19 [History 

Confirmed 01/22/19]


Cholecalciferol TAB* [Vitamin D TAB*] 2,000 units PO DAILY 01/22/19 [History 

Confirmed 01/22/19]


Famciclovir(NF) [Famvir(NF)] 500 mg PO QPM 01/22/19 [History Confirmed 01/22/19]


Montelukast Sodium TAB* [Singulair 10 MG TAB*] 10 mg PO QPM 01/22/19 [History 

Confirmed 01/22/19]











PMH/Surg Hx/FS Hx/Imm Hx


Cardiovascular History: Reports: Hx Hypercholesterolemia


Respiratory History: Reports: Hx Asthma


   Denies: Hx Chronic Obstructive Pulmonary Disease (COPD)


GI History: 


   Denies: Hx Gall Bladder Disease


Sensory History: Reports: Hx Contacts or Glasses


Opthamlomology History: Reports: Hx Contacts or Glasses


Infectious Disease History: No


Infectious Disease History: 


   Denies: Traveled Outside the [place] in Last 30 Days





- Family History


Known Family History: Positive: Cardiac Disease, Hypertension





- Social History


Lives: With Family


Alcohol Use: Rare


Hx Substance Use: No


Substance Use Type: Reports: None


Hx Tobacco Use: Yes


Smoking Status (MU): Former Smoker





Review of Systems


Positive: Fever, Chills


ENT: Other - nasal congstion 


Positive: Chest Pain


Positive: Shortness Of Breath, Cough


Gastrointestinal: Other - decreased PO intake 


Positive: Abdominal Pain, Vomiting, Diarrhea


Positive: Headache


All Other Systems Reviewed And Are Negative: Yes





Physical Exam





- Summary


Physical Exam Summary: 





Appearance: Well appearing, no pain distress


Skin: warm, dry, reflects adequate perfusion


Head/face: normal


Eyes: EOMI, ANTOINE


ENT: mucous membranes moist


Neck: supple, non-tender


Respiratory: mildly decreased breath sounds. 


Cardiovascular: RRR, pulses symmetrical 


Abdomen: non-tender, soft


Bowel Sounds: present


Musculoskeletal: normal, strength/ROM intact


Neuro: normal, sensory motor intact, A&Ox3


 





Triage Information Reviewed: Yes


Vital Signs On Initial Exam: 


 Initial Vitals











Temp Pulse Resp BP Pulse Ox


 


 98.5 F   74   17   131/73   96 


 


 01/22/19 11:17  01/22/19 11:17  01/22/19 11:17  01/22/19 11:17  01/22/19 11:17











Vital Signs Reviewed: Yes





Diagnostics





- Vital Signs


 Vital Signs











  Temp Pulse Resp BP Pulse Ox


 


 01/22/19 11:17  98.5 F  74  17  131/73  96














- Laboratory


Lab Statement: Any lab studies that have been ordered have been reviewed, and 

results considered in the medical decision making process.





- Radiology


  ** CXR


Radiology Interpretation Completed By: Radiologist


Summary of Radiographic Findings: No active cardiopulmonary disease is noted. 

ED physician has reviewed this report.





Course/Dx





- Course


Course Of Treatment: Nurse's notes reviewed.  She with a history of COPD 

presents with exacerbation likely from mild URI.  Improved with breathing 

treatments, steroids.  Outpatient antibiotics also prescribed as well as 

steroids, inhalers.





- Diagnoses


Differential Diagnosis/HQI/PQRI: Positive: Asthma, Bronchitis, CHF, Chest Wall 

Pain, COPD Exacerbation, Pneumonia, Pneumothorax


Provider Diagnoses: 


 COPD exacerbation








Discharge





- Sign-Out/Discharge


Documenting (check all that apply): Patient Departure





- Discharge Plan


Condition: Improved


Disposition: HOME


Prescriptions: 


Albuterol HFA INHALER* [Ventolin HFA Inhaler*] 2 puff INH Q4H PRN #1 mdi


 PRN Reason: Sob/Wheezing


Levofloxacin TAB* [Levaquin TAB*] 750 mg PO DAILY #5 tab


predniSONE TAB* [Deltasone TAB*] 50 mg PO DAILY #5 tab


Patient Education Materials:  Acute Bronchitis (ED), COPD (Chronic Obstructive 

Pulmonary Disease) (ED)


Forms:  *Work Release


Referrals: 


Shailesh Tse NP [Primary Care Provider] - 


Additional Instructions: 


Use inhaler every 4 hours until well.  Drink plenty of fluids.  Return with 

high fever, difficulty breathing, worse, new symptoms or other concerns.  Call 

today to schedule prompt follow-up with her family doctor.





- Billing Disposition and Condition


Condition: IMPROVED


Disposition: Home





- Attestation Statements


Document Initiated by Jaja: Yes


Documenting Scribe: Sujit Smith


Provider For Whom Jaja is Documenting (Include Credential): Benjamin Patino MD 


Scribe Attestation: 


Sujit LANE, scribed for Benjamin Patino MD  on 01/22/19 at 1505. 


Scribe Documentation Reviewed: Yes


Provider Attestation: 


The documentation as recorded by the Sujit pruitt accurately reflects 

the service I personally performed and the decisions made by Benjamin espinoza MD 


Status of Scribe Document: Viewed